# Patient Record
Sex: FEMALE | Race: BLACK OR AFRICAN AMERICAN | NOT HISPANIC OR LATINO | URBAN - METROPOLITAN AREA
[De-identification: names, ages, dates, MRNs, and addresses within clinical notes are randomized per-mention and may not be internally consistent; named-entity substitution may affect disease eponyms.]

---

## 2017-06-25 ENCOUNTER — EMERGENCY (EMERGENCY)
Facility: HOSPITAL | Age: 57
LOS: 1 days | Discharge: HOME | End: 2017-06-25
Attending: EMERGENCY MEDICINE | Admitting: EMERGENCY MEDICINE
Payer: COMMERCIAL

## 2017-06-25 VITALS
TEMPERATURE: 98 F | HEART RATE: 67 BPM | OXYGEN SATURATION: 100 % | RESPIRATION RATE: 16 BRPM | SYSTOLIC BLOOD PRESSURE: 132 MMHG | DIASTOLIC BLOOD PRESSURE: 91 MMHG

## 2017-06-25 DIAGNOSIS — Z98.51 TUBAL LIGATION STATUS: Chronic | ICD-10-CM

## 2017-06-25 LAB
ALBUMIN SERPL ELPH-MCNC: 4.2 G/DL — SIGNIFICANT CHANGE UP (ref 3.3–5)
ALP SERPL-CCNC: 91 U/L — SIGNIFICANT CHANGE UP (ref 40–120)
ALT FLD-CCNC: 12 U/L — SIGNIFICANT CHANGE UP (ref 4–33)
AST SERPL-CCNC: 16 U/L — SIGNIFICANT CHANGE UP (ref 4–32)
BASOPHILS # BLD AUTO: 0.02 K/UL — SIGNIFICANT CHANGE UP (ref 0–0.2)
BASOPHILS NFR BLD AUTO: 0.1 % — SIGNIFICANT CHANGE UP (ref 0–2)
BILIRUB SERPL-MCNC: 0.2 MG/DL — SIGNIFICANT CHANGE UP (ref 0.2–1.2)
BUN SERPL-MCNC: 15 MG/DL — SIGNIFICANT CHANGE UP (ref 7–23)
CALCIUM SERPL-MCNC: 9.4 MG/DL — SIGNIFICANT CHANGE UP (ref 8.4–10.5)
CHLORIDE SERPL-SCNC: 104 MMOL/L — SIGNIFICANT CHANGE UP (ref 98–107)
CK MB BLD-MCNC: 1.64 NG/ML — SIGNIFICANT CHANGE UP (ref 1–4.7)
CK MB BLD-MCNC: 1.65 NG/ML — SIGNIFICANT CHANGE UP (ref 1–4.7)
CK MB BLD-MCNC: SIGNIFICANT CHANGE UP (ref 0–2.5)
CK SERPL-CCNC: 71 U/L — SIGNIFICANT CHANGE UP (ref 25–170)
CK SERPL-CCNC: 72 U/L — SIGNIFICANT CHANGE UP (ref 25–170)
CO2 SERPL-SCNC: 23 MMOL/L — SIGNIFICANT CHANGE UP (ref 22–31)
CREAT SERPL-MCNC: 0.81 MG/DL — SIGNIFICANT CHANGE UP (ref 0.5–1.3)
EOSINOPHIL # BLD AUTO: 0.18 K/UL — SIGNIFICANT CHANGE UP (ref 0–0.5)
EOSINOPHIL NFR BLD AUTO: 1.3 % — SIGNIFICANT CHANGE UP (ref 0–6)
GLUCOSE SERPL-MCNC: 106 MG/DL — HIGH (ref 70–99)
HBA1C BLD-MCNC: 6.3 % — HIGH (ref 4–5.6)
HCT VFR BLD CALC: 39.8 % — SIGNIFICANT CHANGE UP (ref 34.5–45)
HGB BLD-MCNC: 13.1 G/DL — SIGNIFICANT CHANGE UP (ref 11.5–15.5)
IMM GRANULOCYTES NFR BLD AUTO: 0.4 % — SIGNIFICANT CHANGE UP (ref 0–1.5)
LYMPHOCYTES # BLD AUTO: 31.9 % — SIGNIFICANT CHANGE UP (ref 13–44)
LYMPHOCYTES # BLD AUTO: 4.43 K/UL — HIGH (ref 1–3.3)
MCHC RBC-ENTMCNC: 29.2 PG — SIGNIFICANT CHANGE UP (ref 27–34)
MCHC RBC-ENTMCNC: 32.9 % — SIGNIFICANT CHANGE UP (ref 32–36)
MCV RBC AUTO: 88.8 FL — SIGNIFICANT CHANGE UP (ref 80–100)
MONOCYTES # BLD AUTO: 0.83 K/UL — SIGNIFICANT CHANGE UP (ref 0–0.9)
MONOCYTES NFR BLD AUTO: 6 % — SIGNIFICANT CHANGE UP (ref 2–14)
NEUTROPHILS # BLD AUTO: 8.37 K/UL — HIGH (ref 1.8–7.4)
NEUTROPHILS NFR BLD AUTO: 60.3 % — SIGNIFICANT CHANGE UP (ref 43–77)
PLATELET # BLD AUTO: 347 K/UL — SIGNIFICANT CHANGE UP (ref 150–400)
PMV BLD: 9.2 FL — SIGNIFICANT CHANGE UP (ref 7–13)
POTASSIUM SERPL-MCNC: 3.6 MMOL/L — SIGNIFICANT CHANGE UP (ref 3.5–5.3)
POTASSIUM SERPL-SCNC: 3.6 MMOL/L — SIGNIFICANT CHANGE UP (ref 3.5–5.3)
PROT SERPL-MCNC: 7.4 G/DL — SIGNIFICANT CHANGE UP (ref 6–8.3)
RBC # BLD: 4.48 M/UL — SIGNIFICANT CHANGE UP (ref 3.8–5.2)
RBC # FLD: 14.4 % — SIGNIFICANT CHANGE UP (ref 10.3–14.5)
SODIUM SERPL-SCNC: 143 MMOL/L — SIGNIFICANT CHANGE UP (ref 135–145)
TROPONIN T SERPL-MCNC: < 0.06 NG/ML — SIGNIFICANT CHANGE UP (ref 0–0.06)
TROPONIN T SERPL-MCNC: < 0.06 NG/ML — SIGNIFICANT CHANGE UP (ref 0–0.06)
WBC # BLD: 13.88 K/UL — HIGH (ref 3.8–10.5)
WBC # FLD AUTO: 13.88 K/UL — HIGH (ref 3.8–10.5)

## 2017-06-25 PROCEDURE — 99220: CPT

## 2017-06-25 PROCEDURE — 71020: CPT | Mod: 26

## 2017-06-25 RX ORDER — AMLODIPINE BESYLATE 2.5 MG/1
5 TABLET ORAL DAILY
Qty: 0 | Refills: 0 | Status: DISCONTINUED | OUTPATIENT
Start: 2017-06-25 | End: 2017-06-29

## 2017-06-25 RX ORDER — VALSARTAN 80 MG/1
160 TABLET ORAL DAILY
Qty: 0 | Refills: 0 | Status: DISCONTINUED | OUTPATIENT
Start: 2017-06-25 | End: 2017-06-29

## 2017-06-25 RX ORDER — ASPIRIN/CALCIUM CARB/MAGNESIUM 324 MG
162 TABLET ORAL ONCE
Qty: 0 | Refills: 0 | Status: COMPLETED | OUTPATIENT
Start: 2017-06-25 | End: 2017-06-25

## 2017-06-25 RX ORDER — ALBUTEROL 90 UG/1
2 AEROSOL, METERED ORAL EVERY 4 HOURS
Qty: 0 | Refills: 0 | Status: DISCONTINUED | OUTPATIENT
Start: 2017-06-25 | End: 2017-06-29

## 2017-06-25 RX ADMIN — ALBUTEROL 2 PUFF(S): 90 AEROSOL, METERED ORAL at 19:20

## 2017-06-25 RX ADMIN — Medication 500 MILLIGRAM(S): at 13:59

## 2017-06-25 RX ADMIN — Medication 40 MILLIGRAM(S): at 15:46

## 2017-06-25 RX ADMIN — Medication 162 MILLIGRAM(S): at 15:46

## 2017-06-25 RX ADMIN — Medication 500 MILLIGRAM(S): at 13:29

## 2017-06-25 NOTE — ED PROVIDER NOTE - MEDICAL DECISION MAKING DETAILS
likely bronchitis, well appearing w/ clear lungs, will check cxr, given spacer and instructed on proper use w/ albuterol, will also d/c w/ naproxen

## 2017-06-25 NOTE — ED CDU PROVIDER NOTE - PLAN OF CARE
Take aspirin daily, follow up with cardiology (see attached list). Rest, drink plenty of fluids.  Advance activity as tolerated.  Continue all previously prescribed medications as directed. You can use motrin 600mg every 6-8 hours for pain or fever, and/or Tylenol 650 mg every 4 hours for pain/fever. Follow up with your primary care physician in 48-72 hours- bring copies of your results.  Return to the emergency department for chest pain, shortness of breath, dizziness, or worsening, concerning, or persistent symptoms.

## 2017-06-25 NOTE — ED PROVIDER NOTE - ATTENDING CONTRIBUTION TO CARE
Locurto pt c/o 1- increased nasal congestion and productive cough for 5 days with development of b/l reib pain with cough  2 2 weeks of HATHAWAY  needs to stop mid flight when climbing stairs  feels tight in chest (+feels like can't get air in)  Pt w/o h/o RAD thogh has had subjective wheezing   given albuterol pump by carla but use limited by side effects  exam pt in NAD no wheezes or rales on exam at present  card RRR no murmur or gallop no LE edema no calf tenderness Locurto pt c/o 1- increased nasal congestion and productive cough for 5 days with development of b/l reib pain with cough  2 2 weeks of HATHAWAY  needs to stop mid flight when climbing stairs  feels tight in chest (+feels like can't get air in)  Pt w/o h/o RAD thogh has had subjective wheezing   given albuterol pump by carla but use limited by side effects  exam pt in NAD no wheezes or rales on exam at present  card RRR no murmur or gallop no LE edema no calf tenderness  EKG no ischemic changes  CXR negative  most likely new RAD    however on exam here lungs clear    HATHAWAY/chest tiightness predates URI  will treat RAD hold for stress in am

## 2017-06-25 NOTE — ED CDU PROVIDER NOTE - PROGRESS NOTE DETAILS
Pt sleeping. CE'S neg x 2. Awaiting stress test in AM ROBERTO CARLOS Sotelo: stress test normal study, pt doing well. Mild cough but feels improve. Denies SOB or CP at this time. Will d/c home with outpatient follow up. Pt agrees with plan. Todd: Supervised ROBERTO CARLOS Sotelo 6/26/17 Todd (Physician) CDU ATTENDING D/C NOTE: Middle-age woman, smoker, URI Sx. Gets CP when coughs. Didn't respond to typical outpatient URI/bronchospasm Rx. Came to ED. Admitted for CDU for stress test. Trop and EKG w/o e/o acute ischemia. Stress test neg. Appears well. NAD. AFVSS. Strong pulse. Respirations unlabored. No LE edema. Discharge home.

## 2017-06-25 NOTE — ED CDU PROVIDER NOTE - OBJECTIVE STATEMENT
57 year old female +tobacco use 1ppd x ~40 years, HTN pw 1 week of productive cough with green sputum associated with bilateral posterior chest wall pain and HATHAWAY. Chest wall pain is sore in nature - pain only occurs when coughing - not actively having pain now. Pt has been taking Mucinex with minimal relief. Pt went to urgent care center >1 week ago for HATHAWAY given inhaler but has not been using it because it makes her feel "jittery". Admits to nasal congestion. Denies n/v/f/c, abdominal pain, lightheadedness, dizziness, LOC, syncope, hx of DVT/clot/PE, hx of travel, hemoptysis, hx of asthma/COPD. 57 year old female +tobacco use 1ppd x ~40 years, HTN pw 1 week of productive cough with green sputum associated with bilateral posterior chest wall pain and HATHAWAY. Chest wall pain is sore in nature - pain only occurs when coughing - not actively having pain now. Pt has been taking Mucinex with minimal relief. Pt went to urgent care center >1 week ago for HATHAWAY given inhaler but has not been using it because it makes her feel "jittery". Admits to nasal congestion. Denies n/v/f/c, abdominal pain, lightheadedness, dizziness, LOC, syncope, hx of DVT/clot/PE, hx of travel, hemoptysis, hx of asthma/COPD, urinary symptoms.

## 2017-06-25 NOTE — ED PROVIDER NOTE - OBJECTIVE STATEMENT
56yo F w/ HTN, smoked 1pk/day from teens until last wk (currently w/ nicotine patch), p/w 5d of cough w/ yellow sputum and b/l rib pain from frequent coughing.  Taking mucinex w/o relief, has used occasional albuterol w/ some relief but not frequently b/c of jitteriness.  Denies fever.

## 2017-06-25 NOTE — ED CDU PROVIDER NOTE - MEDICAL DECISION MAKING DETAILS
57 year old female +tobacco use 1ppd x ~40 years, HTN pw 1 week of productive cough with green sputum associated with bilateral posterior chest wall pain and HATHAWAY.  Plan: ce, EKG, stress, steroids, albuterol

## 2017-06-25 NOTE — ED CDU PROVIDER NOTE - ATTENDING CONTRIBUTION TO CARE
57F h/o HTN, TOB presents with progressively worsening HATHAWAY and chest tightness over the last several weeks.  Also with cough and nasal congestion over the last week.  No fever.  No prior cardiac or pulmonary history.  In ED cough somewhat improved with albuterol.  CXR clear.  EKG no acute ischemia.  Labs including trop negative.  On exam well appearing, nad, lungs clear, rrr, abd soft, no rash, no edema, 2+ pulses, speech clear, awake and alert. Plan for serial trop and stress in the morning. BRIANNA Cortes MD

## 2017-06-25 NOTE — ED ADULT TRIAGE NOTE - CHIEF COMPLAINT QUOTE
Pt with a productive cough, c/o bilateral pain to her ribs, worse when coughing and taking deep breaths, denies chest pain, c/o SOB, denies fevers/chills.

## 2017-06-26 VITALS
RESPIRATION RATE: 18 BRPM | SYSTOLIC BLOOD PRESSURE: 128 MMHG | HEART RATE: 79 BPM | OXYGEN SATURATION: 100 % | DIASTOLIC BLOOD PRESSURE: 84 MMHG | TEMPERATURE: 98 F

## 2017-06-26 PROCEDURE — 78452 HT MUSCLE IMAGE SPECT MULT: CPT | Mod: 26

## 2017-06-26 PROCEDURE — 93018 CV STRESS TEST I&R ONLY: CPT | Mod: GC

## 2017-06-26 PROCEDURE — 93016 CV STRESS TEST SUPVJ ONLY: CPT | Mod: GC

## 2017-06-26 PROCEDURE — 99217: CPT

## 2017-06-26 RX ADMIN — AMLODIPINE BESYLATE 5 MILLIGRAM(S): 2.5 TABLET ORAL at 06:04

## 2017-06-26 RX ADMIN — ALBUTEROL 2 PUFF(S): 90 AEROSOL, METERED ORAL at 10:00

## 2017-06-26 RX ADMIN — VALSARTAN 160 MILLIGRAM(S): 80 TABLET ORAL at 06:04

## 2017-06-26 RX ADMIN — Medication 40 MILLIGRAM(S): at 09:56

## 2018-03-26 ENCOUNTER — EMERGENCY (EMERGENCY)
Facility: HOSPITAL | Age: 58
LOS: 1 days | Discharge: HOME | End: 2018-03-26
Attending: EMERGENCY MEDICINE | Admitting: EMERGENCY MEDICINE
Payer: COMMERCIAL

## 2018-03-26 VITALS
RESPIRATION RATE: 18 BRPM | OXYGEN SATURATION: 98 % | HEART RATE: 85 BPM | TEMPERATURE: 98 F | SYSTOLIC BLOOD PRESSURE: 137 MMHG | DIASTOLIC BLOOD PRESSURE: 57 MMHG

## 2018-03-26 DIAGNOSIS — Z98.51 TUBAL LIGATION STATUS: Chronic | ICD-10-CM

## 2018-03-26 LAB
ALBUMIN SERPL ELPH-MCNC: 4.2 G/DL — SIGNIFICANT CHANGE UP (ref 3.3–5)
ALP SERPL-CCNC: 87 U/L — SIGNIFICANT CHANGE UP (ref 40–120)
ALT FLD-CCNC: 20 U/L — SIGNIFICANT CHANGE UP (ref 4–33)
AST SERPL-CCNC: 22 U/L — SIGNIFICANT CHANGE UP (ref 4–32)
BASOPHILS # BLD AUTO: 0.04 K/UL — SIGNIFICANT CHANGE UP (ref 0–0.2)
BASOPHILS NFR BLD AUTO: 0.3 % — SIGNIFICANT CHANGE UP (ref 0–2)
BILIRUB SERPL-MCNC: 0.3 MG/DL — SIGNIFICANT CHANGE UP (ref 0.2–1.2)
BUN SERPL-MCNC: 13 MG/DL — SIGNIFICANT CHANGE UP (ref 7–23)
CALCIUM SERPL-MCNC: 9.2 MG/DL — SIGNIFICANT CHANGE UP (ref 8.4–10.5)
CHLORIDE SERPL-SCNC: 101 MMOL/L — SIGNIFICANT CHANGE UP (ref 98–107)
CO2 SERPL-SCNC: 25 MMOL/L — SIGNIFICANT CHANGE UP (ref 22–31)
CREAT SERPL-MCNC: 0.73 MG/DL — SIGNIFICANT CHANGE UP (ref 0.5–1.3)
EOSINOPHIL # BLD AUTO: 0.13 K/UL — SIGNIFICANT CHANGE UP (ref 0–0.5)
EOSINOPHIL NFR BLD AUTO: 1 % — SIGNIFICANT CHANGE UP (ref 0–6)
GLUCOSE SERPL-MCNC: 101 MG/DL — HIGH (ref 70–99)
HCT VFR BLD CALC: 38.8 % — SIGNIFICANT CHANGE UP (ref 34.5–45)
HGB BLD-MCNC: 13.1 G/DL — SIGNIFICANT CHANGE UP (ref 11.5–15.5)
IMM GRANULOCYTES # BLD AUTO: 0.03 # — SIGNIFICANT CHANGE UP
IMM GRANULOCYTES NFR BLD AUTO: 0.2 % — SIGNIFICANT CHANGE UP (ref 0–1.5)
LYMPHOCYTES # BLD AUTO: 29 % — SIGNIFICANT CHANGE UP (ref 13–44)
LYMPHOCYTES # BLD AUTO: 3.74 K/UL — HIGH (ref 1–3.3)
MCHC RBC-ENTMCNC: 29.6 PG — SIGNIFICANT CHANGE UP (ref 27–34)
MCHC RBC-ENTMCNC: 33.8 % — SIGNIFICANT CHANGE UP (ref 32–36)
MCV RBC AUTO: 87.8 FL — SIGNIFICANT CHANGE UP (ref 80–100)
MONOCYTES # BLD AUTO: 0.87 K/UL — SIGNIFICANT CHANGE UP (ref 0–0.9)
MONOCYTES NFR BLD AUTO: 6.7 % — SIGNIFICANT CHANGE UP (ref 2–14)
NEUTROPHILS # BLD AUTO: 8.1 K/UL — HIGH (ref 1.8–7.4)
NEUTROPHILS NFR BLD AUTO: 62.8 % — SIGNIFICANT CHANGE UP (ref 43–77)
NRBC # FLD: 0 — SIGNIFICANT CHANGE UP
PLATELET # BLD AUTO: 320 K/UL — SIGNIFICANT CHANGE UP (ref 150–400)
PMV BLD: 9.7 FL — SIGNIFICANT CHANGE UP (ref 7–13)
POTASSIUM SERPL-MCNC: 3.8 MMOL/L — SIGNIFICANT CHANGE UP (ref 3.5–5.3)
POTASSIUM SERPL-SCNC: 3.8 MMOL/L — SIGNIFICANT CHANGE UP (ref 3.5–5.3)
PROT SERPL-MCNC: 7.6 G/DL — SIGNIFICANT CHANGE UP (ref 6–8.3)
RBC # BLD: 4.42 M/UL — SIGNIFICANT CHANGE UP (ref 3.8–5.2)
RBC # FLD: 14.5 % — SIGNIFICANT CHANGE UP (ref 10.3–14.5)
SODIUM SERPL-SCNC: 138 MMOL/L — SIGNIFICANT CHANGE UP (ref 135–145)
WBC # BLD: 12.91 K/UL — HIGH (ref 3.8–10.5)
WBC # FLD AUTO: 12.91 K/UL — HIGH (ref 3.8–10.5)

## 2018-03-26 PROCEDURE — 70450 CT HEAD/BRAIN W/O DYE: CPT | Mod: 26

## 2018-03-26 PROCEDURE — 99284 EMERGENCY DEPT VISIT MOD MDM: CPT

## 2018-03-26 RX ORDER — MECLIZINE HCL 12.5 MG
1 TABLET ORAL
Qty: 9 | Refills: 0
Start: 2018-03-26 | End: 2018-03-28

## 2018-03-26 RX ORDER — MECLIZINE HCL 12.5 MG
50 TABLET ORAL ONCE
Qty: 0 | Refills: 0 | Status: COMPLETED | OUTPATIENT
Start: 2018-03-26 | End: 2018-03-26

## 2018-03-26 RX ADMIN — Medication 50 MILLIGRAM(S): at 13:35

## 2018-03-26 NOTE — ED PROVIDER NOTE - MEDICAL DECISION MAKING DETAILS
57F h/o HTN p/w intermittent dizziness, lasting few minutes, worse after standing, a/w tinnitus, normal exam, concerning for bppv  -supportive tx

## 2018-03-26 NOTE — ED PROVIDER NOTE - PROGRESS NOTE DETAILS
feeling much better after meclizine. labs and imaging unremarkable, discussed incidental finding of osteoma. nml tandem gait on repeat exam. will d/c and advise neuro f/u

## 2018-03-26 NOTE — ED PROVIDER NOTE - OBJECTIVE STATEMENT
57F h/o HTN p/w dizziness. Notes dizziness starting 3 days ago, lasting few minutes, occurred after standing, felt dizzy, unsteady, had to sit down. Dizziness recurred again today after standing, dizziness resolved though pt still feels "uncentered," a/w bilateral tinnitus. Denies F, CP/SOB, abd pain, weakness, numbness.

## 2018-03-26 NOTE — ED ADULT TRIAGE NOTE - CHIEF COMPLAINT QUOTE
p/t c/o of dizziness on and off for few days, ringing sensation to bilateral ears as well, denies any headaches, no chest pain , no neuro deficits noted

## 2018-03-26 NOTE — ED PROVIDER NOTE - ATTENDING CONTRIBUTION TO CARE
Locurto  pt c/o 2 episodes of dizziness with position change  h/o prior episode 6  mos ago  This time assoc with tinnitus    exam pt in NAD clear lungs  no murmurs  benign abd  neuro Nl strength sensation and cerebellar  Hallpike produced similar sx (though milder)  Rhomberg negative  Pt able to tandem walk with mild difficulty  Imp vertigo with tinnitus  no other hard CN finding  suggestive of inner ear  CT performed  treated with meclizine  should have outpt neuro follow up

## 2018-09-19 ENCOUNTER — RESULT REVIEW (OUTPATIENT)
Age: 58
End: 2018-09-19

## 2018-10-17 ENCOUNTER — RESULT REVIEW (OUTPATIENT)
Age: 58
End: 2018-10-17

## 2019-08-26 ENCOUNTER — EMERGENCY (EMERGENCY)
Facility: HOSPITAL | Age: 59
LOS: 1 days | Discharge: HOME | End: 2019-08-26
Attending: EMERGENCY MEDICINE | Admitting: EMERGENCY MEDICINE
Payer: COMMERCIAL

## 2019-08-26 VITALS
OXYGEN SATURATION: 97 % | RESPIRATION RATE: 15 BRPM | SYSTOLIC BLOOD PRESSURE: 135 MMHG | DIASTOLIC BLOOD PRESSURE: 74 MMHG | HEART RATE: 105 BPM | TEMPERATURE: 102 F

## 2019-08-26 DIAGNOSIS — Z98.51 TUBAL LIGATION STATUS: Chronic | ICD-10-CM

## 2019-08-26 PROCEDURE — 99284 EMERGENCY DEPT VISIT MOD MDM: CPT

## 2019-08-26 RX ORDER — ACETAMINOPHEN 500 MG
650 TABLET ORAL ONCE
Refills: 0 | Status: COMPLETED | OUTPATIENT
Start: 2019-08-26 | End: 2019-08-26

## 2019-08-26 RX ADMIN — Medication 650 MILLIGRAM(S): at 23:06

## 2019-08-26 NOTE — ED ADULT TRIAGE NOTE - CHIEF COMPLAINT QUOTE
Pt c/o nausea/vomiting, chills, and generalized body aches for the past 3 days.  Pt endorses dizziness.  Pt unable to tolerate PO.  Pt states was visiting granddaughter and she  had a fever while she was there.  PMHx: HTN

## 2019-08-27 VITALS
RESPIRATION RATE: 18 BRPM | TEMPERATURE: 99 F | SYSTOLIC BLOOD PRESSURE: 101 MMHG | DIASTOLIC BLOOD PRESSURE: 65 MMHG | OXYGEN SATURATION: 98 % | HEART RATE: 72 BPM

## 2019-08-27 LAB
ALBUMIN SERPL ELPH-MCNC: 3.8 G/DL — SIGNIFICANT CHANGE UP (ref 3.3–5)
ALP SERPL-CCNC: 69 U/L — SIGNIFICANT CHANGE UP (ref 40–120)
ALT FLD-CCNC: 15 U/L — SIGNIFICANT CHANGE UP (ref 4–33)
ANION GAP SERPL CALC-SCNC: 20 MMO/L — HIGH (ref 7–14)
APPEARANCE UR: SIGNIFICANT CHANGE UP
AST SERPL-CCNC: 34 U/L — HIGH (ref 4–32)
BACTERIA # UR AUTO: NEGATIVE — SIGNIFICANT CHANGE UP
BASE EXCESS BLDV CALC-SCNC: -4.7 MMOL/L — SIGNIFICANT CHANGE UP
BASOPHILS # BLD AUTO: 0.04 K/UL — SIGNIFICANT CHANGE UP (ref 0–0.2)
BASOPHILS NFR BLD AUTO: 0.3 % — SIGNIFICANT CHANGE UP (ref 0–2)
BILIRUB SERPL-MCNC: 0.4 MG/DL — SIGNIFICANT CHANGE UP (ref 0.2–1.2)
BILIRUB UR-MCNC: NEGATIVE — SIGNIFICANT CHANGE UP
BLOOD GAS VENOUS - CREATININE: 1.16 MG/DL — SIGNIFICANT CHANGE UP (ref 0.5–1.3)
BLOOD UR QL VISUAL: HIGH
BUN SERPL-MCNC: 13 MG/DL — SIGNIFICANT CHANGE UP (ref 7–23)
CALCIUM SERPL-MCNC: 8.9 MG/DL — SIGNIFICANT CHANGE UP (ref 8.4–10.5)
CHLORIDE BLDV-SCNC: 101 MMOL/L — SIGNIFICANT CHANGE UP (ref 96–108)
CHLORIDE SERPL-SCNC: 94 MMOL/L — LOW (ref 98–107)
CO2 SERPL-SCNC: 16 MMOL/L — LOW (ref 22–31)
COLOR SPEC: YELLOW — SIGNIFICANT CHANGE UP
CREAT SERPL-MCNC: 1.08 MG/DL — SIGNIFICANT CHANGE UP (ref 0.5–1.3)
EOSINOPHIL # BLD AUTO: 0.01 K/UL — SIGNIFICANT CHANGE UP (ref 0–0.5)
EOSINOPHIL NFR BLD AUTO: 0.1 % — SIGNIFICANT CHANGE UP (ref 0–6)
GAS PNL BLDV: 128 MMOL/L — LOW (ref 136–146)
GLUCOSE BLDV-MCNC: 101 MG/DL — HIGH (ref 70–99)
GLUCOSE SERPL-MCNC: 91 MG/DL — SIGNIFICANT CHANGE UP (ref 70–99)
GLUCOSE UR-MCNC: NEGATIVE — SIGNIFICANT CHANGE UP
HCO3 BLDV-SCNC: 20 MMOL/L — SIGNIFICANT CHANGE UP (ref 20–27)
HCT VFR BLD CALC: 32.5 % — LOW (ref 34.5–45)
HCT VFR BLDV CALC: 36.8 % — SIGNIFICANT CHANGE UP (ref 34.5–45)
HGB BLD-MCNC: 11.1 G/DL — LOW (ref 11.5–15.5)
HGB BLDV-MCNC: 12 G/DL — SIGNIFICANT CHANGE UP (ref 11.5–15.5)
IMM GRANULOCYTES NFR BLD AUTO: 0.5 % — SIGNIFICANT CHANGE UP (ref 0–1.5)
KETONES UR-MCNC: HIGH
LACTATE BLDV-MCNC: 1.1 MMOL/L — SIGNIFICANT CHANGE UP (ref 0.5–2)
LEUKOCYTE ESTERASE UR-ACNC: NEGATIVE — SIGNIFICANT CHANGE UP
LYMPHOCYTES # BLD AUTO: 15.6 % — SIGNIFICANT CHANGE UP (ref 13–44)
LYMPHOCYTES # BLD AUTO: 2.21 K/UL — SIGNIFICANT CHANGE UP (ref 1–3.3)
MCHC RBC-ENTMCNC: 28.7 PG — SIGNIFICANT CHANGE UP (ref 27–34)
MCHC RBC-ENTMCNC: 34.2 % — SIGNIFICANT CHANGE UP (ref 32–36)
MCV RBC AUTO: 84 FL — SIGNIFICANT CHANGE UP (ref 80–100)
MONOCYTES # BLD AUTO: 1.08 K/UL — HIGH (ref 0–0.9)
MONOCYTES NFR BLD AUTO: 7.6 % — SIGNIFICANT CHANGE UP (ref 2–14)
NEUTROPHILS # BLD AUTO: 10.75 K/UL — HIGH (ref 1.8–7.4)
NEUTROPHILS NFR BLD AUTO: 75.9 % — SIGNIFICANT CHANGE UP (ref 43–77)
NITRITE UR-MCNC: NEGATIVE — SIGNIFICANT CHANGE UP
NRBC # FLD: 0 K/UL — SIGNIFICANT CHANGE UP (ref 0–0)
PCO2 BLDV: 30 MMHG — LOW (ref 41–51)
PH BLDV: 7.42 PH — SIGNIFICANT CHANGE UP (ref 7.32–7.43)
PH UR: 6 — SIGNIFICANT CHANGE UP (ref 5–8)
PLATELET # BLD AUTO: 190 K/UL — SIGNIFICANT CHANGE UP (ref 150–400)
PMV BLD: 10 FL — SIGNIFICANT CHANGE UP (ref 7–13)
PO2 BLDV: 30 MMHG — LOW (ref 35–40)
POTASSIUM BLDV-SCNC: 3.2 MMOL/L — LOW (ref 3.4–4.5)
POTASSIUM SERPL-MCNC: 3.5 MMOL/L — SIGNIFICANT CHANGE UP (ref 3.5–5.3)
POTASSIUM SERPL-SCNC: 3.5 MMOL/L — SIGNIFICANT CHANGE UP (ref 3.5–5.3)
PROT SERPL-MCNC: 7.7 G/DL — SIGNIFICANT CHANGE UP (ref 6–8.3)
PROT UR-MCNC: 300 — HIGH
RBC # BLD: 3.87 M/UL — SIGNIFICANT CHANGE UP (ref 3.8–5.2)
RBC # FLD: 15.3 % — HIGH (ref 10.3–14.5)
RBC CASTS # UR COMP ASSIST: SIGNIFICANT CHANGE UP (ref 0–?)
SAO2 % BLDV: 54 % — LOW (ref 60–85)
SODIUM SERPL-SCNC: 130 MMOL/L — LOW (ref 135–145)
SP GR SPEC: 1.02 — SIGNIFICANT CHANGE UP (ref 1–1.04)
SQUAMOUS # UR AUTO: SIGNIFICANT CHANGE UP
UROBILINOGEN FLD QL: SIGNIFICANT CHANGE UP
WBC # BLD: 14.16 K/UL — HIGH (ref 3.8–10.5)
WBC # FLD AUTO: 14.16 K/UL — HIGH (ref 3.8–10.5)
WBC UR QL: HIGH (ref 0–?)

## 2019-08-27 RX ORDER — SODIUM CHLORIDE 9 MG/ML
1000 INJECTION, SOLUTION INTRAVENOUS
Refills: 0 | Status: DISCONTINUED | OUTPATIENT
Start: 2019-08-27 | End: 2019-08-30

## 2019-08-27 RX ORDER — CEPHALEXIN 500 MG
1 CAPSULE ORAL
Qty: 14 | Refills: 0
Start: 2019-08-27 | End: 2019-09-02

## 2019-08-27 RX ORDER — SODIUM CHLORIDE 9 MG/ML
1000 INJECTION INTRAMUSCULAR; INTRAVENOUS; SUBCUTANEOUS ONCE
Refills: 0 | Status: COMPLETED | OUTPATIENT
Start: 2019-08-27 | End: 2019-08-27

## 2019-08-27 RX ORDER — CEFTRIAXONE 500 MG/1
1000 INJECTION, POWDER, FOR SOLUTION INTRAMUSCULAR; INTRAVENOUS ONCE
Refills: 0 | Status: COMPLETED | OUTPATIENT
Start: 2019-08-27 | End: 2019-08-27

## 2019-08-27 RX ADMIN — SODIUM CHLORIDE 1000 MILLILITER(S): 9 INJECTION INTRAMUSCULAR; INTRAVENOUS; SUBCUTANEOUS at 04:00

## 2019-08-27 RX ADMIN — Medication 650 MILLIGRAM(S): at 01:43

## 2019-08-27 RX ADMIN — SODIUM CHLORIDE 1000 MILLILITER(S): 9 INJECTION INTRAMUSCULAR; INTRAVENOUS; SUBCUTANEOUS at 03:06

## 2019-08-27 RX ADMIN — SODIUM CHLORIDE 1000 MILLILITER(S): 9 INJECTION INTRAMUSCULAR; INTRAVENOUS; SUBCUTANEOUS at 01:43

## 2019-08-27 RX ADMIN — SODIUM CHLORIDE 500 MILLILITER(S): 9 INJECTION, SOLUTION INTRAVENOUS at 04:27

## 2019-08-27 RX ADMIN — SODIUM CHLORIDE 1000 MILLILITER(S): 9 INJECTION INTRAMUSCULAR; INTRAVENOUS; SUBCUTANEOUS at 01:33

## 2019-08-27 RX ADMIN — CEFTRIAXONE 100 MILLIGRAM(S): 500 INJECTION, POWDER, FOR SOLUTION INTRAMUSCULAR; INTRAVENOUS at 04:26

## 2019-08-27 NOTE — ED ADULT NURSE NOTE - NSIMPLEMENTINTERV_GEN_ALL_ED
Implemented All Universal Safety Interventions:  [place] to call system. Call bell, personal items and telephone within reach. Instruct patient to call for assistance. Room bathroom lighting operational. Non-slip footwear when patient is off stretcher. Physically safe environment: no spills, clutter or unnecessary equipment. Stretcher in lowest position, wheels locked, appropriate side rails in place.

## 2019-08-27 NOTE — ED PROVIDER NOTE - CARE PLAN
Principal Discharge DX:	Weakness  Secondary Diagnosis:	Dizziness Principal Discharge DX:	Weakness  Secondary Diagnosis:	Dizziness  Secondary Diagnosis:	Urinary tract infection without hematuria, site unspecified

## 2019-08-27 NOTE — ED ADULT NURSE NOTE - OBJECTIVE STATEMENT
Pt arrived to room 28, a&ox4, pleasant, calm and cooperative. Pt c/o of generalized body ache for about 3 days. Pt stated to visited family and child was sick. Pt became febrile, decrease PO intake. Pt denies chest pain, sob, n/v/d. Respirations even and unlabored, NAD noted. 20 G IV to right ac, labs drawn and sent. provider at bedside to [facility]. will continue to monitor

## 2019-08-27 NOTE — ED PROVIDER NOTE - OBJECTIVE STATEMENT
60 yo F with HTN that presents with generalized weakness, bodyaches, dizziness, and not tolerating PO for 3 days. Was in [place] visiting granddaughter who was sick. Pt and granddaughter went to  and pt started "feeling funny" 3 days ago but denies any runny nose, cough, headache, vision/hearing changes, paresthesias, N/V/D, chest pain, SOB, abd pain. Today she wanted to come back to [facility] so took [employer] and [employer] and came to ED for evaluation. Hasn't had much to eat or drink in last 3 days but today drank some fluids and had vomiting x 1 without blood. Has not taken any meds otherwise. Also + dysuria and diff urinating, thought it was because she hasn't had much to drink. s/p tubal ligations. No vaginal bleeding or discharge. Non smoker, no drinking, no drugs.

## 2019-08-27 NOTE — ED ADULT NURSE REASSESSMENT NOTE - NS ED NURSE REASSESS COMMENT FT1
Pt resting on stretcher, a&ox4, calm and cooperative. pt denies chest pain, sob, n/v/d. Respirations even and unlabored, NAD noted. Medication to be administered, provider aware. will continue to monitor

## 2019-08-27 NOTE — ED PROVIDER NOTE - PROGRESS NOTE DETAILS
Pt walked to bathroom by herself, not dizzy, no weakness. S/p Ceftriaxone for UTI and D5 1/2 NS for ketonuria. Offered pt admission for symptomatic UTI but refused. Wants to go home to take PO meds and f/u with her PMD. labs given, return precautions explained and understood.

## 2019-08-27 NOTE — ED PROVIDER NOTE - CLINICAL SUMMARY MEDICAL DECISION MAKING FREE TEXT BOX
58 yo F with HTN that presents with generalized weakness with sick contacts 3 days ago, recently returned from [place]. Appears well otherwise with benign exam. Most likely UTI given report and symptoms. Will hydrate with IVF and obtain labs. Pt febrile here, will provide tylenol and monitor. Does meet SIRS but does not appear septic. No URI symptoms reported. Hold RVP for now.

## 2019-08-27 NOTE — ED PROVIDER NOTE - NSFOLLOWUPINSTRUCTIONS_ED_ALL_ED_FT
Please follow up with your primary care doctor after you leave the emergency department so that they can follow up and conduct more testing and treatment as they deem necessary. If you have worsening signs or symptoms of what you came in to the Emergency Department today and are not able to see your doctor, go to your nearest emergency department or return to the [facility] emergency department for further care and management.

## 2019-08-28 ENCOUNTER — INPATIENT (INPATIENT)
Facility: HOSPITAL | Age: 59
LOS: 5 days | Discharge: HOME | DRG: 871 | End: 2019-09-03
Attending: INTERNAL MEDICINE | Admitting: INTERNAL MEDICINE
Payer: COMMERCIAL

## 2019-08-28 VITALS
OXYGEN SATURATION: 96 % | DIASTOLIC BLOOD PRESSURE: 67 MMHG | HEIGHT: 64 IN | RESPIRATION RATE: 22 BRPM | TEMPERATURE: 100 F | WEIGHT: 171.96 LBS | SYSTOLIC BLOOD PRESSURE: 122 MMHG | HEART RATE: 111 BPM

## 2019-08-28 DIAGNOSIS — E87.8 OTHER DISORDERS OF ELECTROLYTE AND FLUID BALANCE, NOT ELSEWHERE CLASSIFIED: ICD-10-CM

## 2019-08-28 DIAGNOSIS — Z98.51 TUBAL LIGATION STATUS: Chronic | ICD-10-CM

## 2019-08-28 DIAGNOSIS — I10 ESSENTIAL (PRIMARY) HYPERTENSION: ICD-10-CM

## 2019-08-28 DIAGNOSIS — A41.9 SEPSIS, UNSPECIFIED ORGANISM: ICD-10-CM

## 2019-08-28 DIAGNOSIS — F10.10 ALCOHOL ABUSE, UNCOMPLICATED: ICD-10-CM

## 2019-08-28 DIAGNOSIS — Z29.9 ENCOUNTER FOR PROPHYLACTIC MEASURES, UNSPECIFIED: ICD-10-CM

## 2019-08-28 DIAGNOSIS — R94.5 ABNORMAL RESULTS OF LIVER FUNCTION STUDIES: ICD-10-CM

## 2019-08-28 DIAGNOSIS — R50.9 FEVER, UNSPECIFIED: ICD-10-CM

## 2019-08-28 DIAGNOSIS — D64.9 ANEMIA, UNSPECIFIED: ICD-10-CM

## 2019-08-28 LAB
ALBUMIN SERPL ELPH-MCNC: 3.9 G/DL — SIGNIFICANT CHANGE UP (ref 3.3–5)
ALP SERPL-CCNC: 90 U/L — SIGNIFICANT CHANGE UP (ref 40–120)
ALT FLD-CCNC: 70 U/L — HIGH (ref 10–45)
ANION GAP SERPL CALC-SCNC: 14 MMOL/L — SIGNIFICANT CHANGE UP (ref 5–17)
AST SERPL-CCNC: 131 U/L — HIGH (ref 10–40)
BACTERIA UR CULT: SIGNIFICANT CHANGE UP
BASOPHILS # BLD AUTO: 0 K/UL — SIGNIFICANT CHANGE UP (ref 0–0.2)
BASOPHILS NFR BLD AUTO: 0.2 % — SIGNIFICANT CHANGE UP (ref 0–2)
BILIRUB SERPL-MCNC: 0.3 MG/DL — SIGNIFICANT CHANGE UP (ref 0.2–1.2)
BUN SERPL-MCNC: 12 MG/DL — SIGNIFICANT CHANGE UP (ref 7–23)
CALCIUM SERPL-MCNC: 9.8 MG/DL — SIGNIFICANT CHANGE UP (ref 8.4–10.5)
CHLORIDE SERPL-SCNC: 97 MMOL/L — SIGNIFICANT CHANGE UP (ref 96–108)
CO2 SERPL-SCNC: 20 MMOL/L — LOW (ref 22–31)
CREAT SERPL-MCNC: 0.86 MG/DL — SIGNIFICANT CHANGE UP (ref 0.5–1.3)
EOSINOPHIL # BLD AUTO: 0 K/UL — SIGNIFICANT CHANGE UP (ref 0–0.5)
EOSINOPHIL NFR BLD AUTO: 0.4 % — SIGNIFICANT CHANGE UP (ref 0–6)
ETHANOL SERPL-MCNC: SIGNIFICANT CHANGE UP MG/DL (ref 0–10)
GAS PNL BLDV: SIGNIFICANT CHANGE UP
GLUCOSE SERPL-MCNC: 110 MG/DL — HIGH (ref 70–99)
HCT VFR BLD CALC: 34.8 % — SIGNIFICANT CHANGE UP (ref 34.5–45)
HGB BLD-MCNC: 11.1 G/DL — LOW (ref 11.5–15.5)
LYMPHOCYTES # BLD AUTO: 1.9 K/UL — SIGNIFICANT CHANGE UP (ref 1–3.3)
LYMPHOCYTES # BLD AUTO: 13.5 % — SIGNIFICANT CHANGE UP (ref 13–44)
MAGNESIUM SERPL-MCNC: 1.8 MG/DL — SIGNIFICANT CHANGE UP (ref 1.6–2.6)
MCHC RBC-ENTMCNC: 28 PG — SIGNIFICANT CHANGE UP (ref 27–34)
MCHC RBC-ENTMCNC: 32 GM/DL — SIGNIFICANT CHANGE UP (ref 32–36)
MCV RBC AUTO: 87.5 FL — SIGNIFICANT CHANGE UP (ref 80–100)
MONOCYTES # BLD AUTO: 0.9 K/UL — SIGNIFICANT CHANGE UP (ref 0–0.9)
MONOCYTES NFR BLD AUTO: 6.3 % — SIGNIFICANT CHANGE UP (ref 2–14)
NEUTROPHILS # BLD AUTO: 11.1 K/UL — HIGH (ref 1.8–7.4)
NEUTROPHILS NFR BLD AUTO: 79.6 % — HIGH (ref 43–77)
PLATELET # BLD AUTO: 229 K/UL — SIGNIFICANT CHANGE UP (ref 150–400)
POTASSIUM SERPL-MCNC: 3.2 MMOL/L — LOW (ref 3.5–5.3)
POTASSIUM SERPL-SCNC: 3.2 MMOL/L — LOW (ref 3.5–5.3)
PROT SERPL-MCNC: 8.1 G/DL — SIGNIFICANT CHANGE UP (ref 6–8.3)
RBC # BLD: 3.98 M/UL — SIGNIFICANT CHANGE UP (ref 3.8–5.2)
RBC # FLD: 15.2 % — HIGH (ref 10.3–14.5)
SODIUM SERPL-SCNC: 131 MMOL/L — LOW (ref 135–145)
SPECIMEN SOURCE: SIGNIFICANT CHANGE UP
TROPONIN T, HIGH SENSITIVITY RESULT: 10 NG/L — SIGNIFICANT CHANGE UP (ref 0–51)
WBC # BLD: 14 K/UL — HIGH (ref 3.8–10.5)
WBC # FLD AUTO: 14 K/UL — HIGH (ref 3.8–10.5)

## 2019-08-28 PROCEDURE — 99285 EMERGENCY DEPT VISIT HI MDM: CPT

## 2019-08-28 PROCEDURE — 71046 X-RAY EXAM CHEST 2 VIEWS: CPT | Mod: 26

## 2019-08-28 PROCEDURE — 93010 ELECTROCARDIOGRAM REPORT: CPT

## 2019-08-28 PROCEDURE — 70450 CT HEAD/BRAIN W/O DYE: CPT | Mod: 26

## 2019-08-28 RX ORDER — SODIUM CHLORIDE 9 MG/ML
1000 INJECTION INTRAMUSCULAR; INTRAVENOUS; SUBCUTANEOUS ONCE
Refills: 0 | Status: COMPLETED | OUTPATIENT
Start: 2019-08-28 | End: 2019-08-28

## 2019-08-28 RX ORDER — AMLODIPINE BESYLATE 2.5 MG/1
5 TABLET ORAL DAILY
Refills: 0 | Status: DISCONTINUED | OUTPATIENT
Start: 2019-08-28 | End: 2019-08-29

## 2019-08-28 RX ORDER — IBUPROFEN 200 MG
400 TABLET ORAL ONCE
Refills: 0 | Status: COMPLETED | OUTPATIENT
Start: 2019-08-28 | End: 2019-08-28

## 2019-08-28 RX ORDER — THIAMINE MONONITRATE (VIT B1) 100 MG
100 TABLET ORAL DAILY
Refills: 0 | Status: DISCONTINUED | OUTPATIENT
Start: 2019-08-28 | End: 2019-09-03

## 2019-08-28 RX ORDER — PANTOPRAZOLE SODIUM 20 MG/1
40 TABLET, DELAYED RELEASE ORAL
Refills: 0 | Status: DISCONTINUED | OUTPATIENT
Start: 2019-08-28 | End: 2019-09-03

## 2019-08-28 RX ORDER — SODIUM CHLORIDE 9 MG/ML
2000 INJECTION INTRAMUSCULAR; INTRAVENOUS; SUBCUTANEOUS ONCE
Refills: 0 | Status: DISCONTINUED | OUTPATIENT
Start: 2019-08-28 | End: 2019-08-28

## 2019-08-28 RX ORDER — SODIUM CHLORIDE 9 MG/ML
1000 INJECTION INTRAMUSCULAR; INTRAVENOUS; SUBCUTANEOUS
Refills: 0 | Status: DISCONTINUED | OUTPATIENT
Start: 2019-08-28 | End: 2019-08-31

## 2019-08-28 RX ORDER — VANCOMYCIN HCL 1 G
1000 VIAL (EA) INTRAVENOUS ONCE
Refills: 0 | Status: COMPLETED | OUTPATIENT
Start: 2019-08-28 | End: 2019-08-28

## 2019-08-28 RX ORDER — FOLIC ACID 0.8 MG
1 TABLET ORAL DAILY
Refills: 0 | Status: DISCONTINUED | OUTPATIENT
Start: 2019-08-28 | End: 2019-09-03

## 2019-08-28 RX ORDER — HEPARIN SODIUM 5000 [USP'U]/ML
5000 INJECTION INTRAVENOUS; SUBCUTANEOUS EVERY 8 HOURS
Refills: 0 | Status: DISCONTINUED | OUTPATIENT
Start: 2019-08-28 | End: 2019-09-03

## 2019-08-28 RX ORDER — POTASSIUM CHLORIDE 20 MEQ
40 PACKET (EA) ORAL ONCE
Refills: 0 | Status: COMPLETED | OUTPATIENT
Start: 2019-08-28 | End: 2019-08-28

## 2019-08-28 RX ORDER — LOSARTAN POTASSIUM 100 MG/1
100 TABLET, FILM COATED ORAL DAILY
Refills: 0 | Status: DISCONTINUED | OUTPATIENT
Start: 2019-08-28 | End: 2019-08-29

## 2019-08-28 RX ORDER — ACETAMINOPHEN 500 MG
650 TABLET ORAL ONCE
Refills: 0 | Status: COMPLETED | OUTPATIENT
Start: 2019-08-28 | End: 2019-08-28

## 2019-08-28 RX ORDER — PIPERACILLIN AND TAZOBACTAM 4; .5 G/20ML; G/20ML
3.38 INJECTION, POWDER, LYOPHILIZED, FOR SOLUTION INTRAVENOUS ONCE
Refills: 0 | Status: COMPLETED | OUTPATIENT
Start: 2019-08-28 | End: 2019-08-28

## 2019-08-28 RX ADMIN — Medication 1 MILLIGRAM(S): at 20:51

## 2019-08-28 RX ADMIN — SODIUM CHLORIDE 1000 MILLILITER(S): 9 INJECTION INTRAMUSCULAR; INTRAVENOUS; SUBCUTANEOUS at 16:00

## 2019-08-28 RX ADMIN — HEPARIN SODIUM 5000 UNIT(S): 5000 INJECTION INTRAVENOUS; SUBCUTANEOUS at 22:18

## 2019-08-28 RX ADMIN — Medication 400 MILLIGRAM(S): at 21:43

## 2019-08-28 RX ADMIN — SODIUM CHLORIDE 100 MILLILITER(S): 9 INJECTION INTRAMUSCULAR; INTRAVENOUS; SUBCUTANEOUS at 20:16

## 2019-08-28 RX ADMIN — Medication 40 MILLIEQUIVALENT(S): at 18:22

## 2019-08-28 RX ADMIN — PIPERACILLIN AND TAZOBACTAM 200 GRAM(S): 4; .5 INJECTION, POWDER, LYOPHILIZED, FOR SOLUTION INTRAVENOUS at 18:22

## 2019-08-28 RX ADMIN — Medication 250 MILLIGRAM(S): at 22:18

## 2019-08-28 RX ADMIN — Medication 650 MILLIGRAM(S): at 20:46

## 2019-08-28 RX ADMIN — Medication 100 MILLIGRAM(S): at 20:51

## 2019-08-28 NOTE — H&P ADULT - NSHPLABSRESULTS_GEN_ALL_CORE
11.1   14.0  )-----------( 229      ( 28 Aug 2019 16:28 )             34.8                   131<L>  |  97  |  12  ----------------------------<  110<H>  3.2<L>   |  20<L>  |  0.86    Ca    9.8      28 Aug 2019 16:28  Mg     1.8         TPro  8.1  /  Alb  3.9  /  TBili  0.3  /  DBili  x   /  AST  131<H>  /  ALT  70<H>  /  AlkPhos  90                         Urinalysis Basic - ( 27 Aug 2019 00:20 )    Color: YELLOW / Appearance: Lt TURBID / S.016 / pH: 6.0  Gluc: NEGATIVE / Ketone: MODERATE  / Bili: NEGATIVE / Urobili: TRACE   Blood: LARGE / Protein: 300 / Nitrite: NEGATIVE   Leuk Esterase: NEGATIVE / RBC: 3-5 / WBC 11-25   Sq Epi: FEW / Non Sq Epi: x / Bacteria: NEGATIVE    < from: CT Head No Cont (19 @ 17:15) >    IMPRESSION:     No interval change from prior, mild volume loss and microvascular   disease, small cerebellum with prominent CSF spaces and decreased   mammillary body size, correlate with EtOH use and thiamine vitamin B1   levels.    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 No new   hemorrhage or midline shift. Basal cisterns remain patent. If symptoms   persist, consider follow-up head CT or MR if no contraindications.      < end of copied text >

## 2019-08-28 NOTE — H&P ADULT - HISTORY OF PRESENT ILLNESS
Pt is a 60 y/o Female with pmhx of HTN, DM presents to ER c/o of dizziness, "shaking" and fevers x1week.  "im in alcohol withdrawal".  patient reports was on a 5 day briggs where she drank a bottle of red wine nightly, although admits to drinking a bottle of wine most nights of the week.  The day after began feeling shaky, and having fevers, admit she does feel like that sometimes after drinking but able to control "withdrawal" with Tylenol PM.  Reports symptoms persisted so went to [facility] 2 days ago and was diagnosed with UTi- has been taking keflex for symptoms.  Reports daily fevers - Tmax 103 Reports still feeling dizziness and chills - head swinging sensation.  Denies acute visual changes, numbness/tinglng, falls, head trauma, abdominal pain, n/v.

## 2019-08-28 NOTE — ED PROVIDER NOTE - OBJECTIVE STATEMENT
60yo F pmhx of HTN, DM presents to ER c/o of dizziness, "shaking" and fevers x1week.  "im in alcohol withdrawal".  patient reports was on a 5 day briggs where she drank a bottle of red wine nightly, although admits to drinking a bottle of wine most nights of the week.  The day after began feeling shaky, and having fevers, admit she does feel like that sometimes after drinking but able to control "withdrawal" with Tylenol PM.  Reports symptoms persisted so went to [facility] 2 days ago and was diagnosed with UTi- has been taking keflex for symptoms.  Reports still feeling dizziness - head swinging sensation.  Denies acute visual changes, numbness/tinglng, falls, head trauma, abdominal pain, n/v. 60yo F pmhx of HTN, DM presents to ER c/o of dizziness, "shaking" and fevers x1week.  "im in alcohol withdrawal".  patient reports was on a 5 day briggs where she drank a bottle of red wine nightly, although admits to drinking a bottle of wine most nights of the week.  The day after began feeling shaky, and having fevers, admit she does feel like that sometimes after drinking but able to control "withdrawal" with Tylenol PM.  Reports symptoms persisted so went to [facility] 2 days ago and was diagnosed with UTi- has been taking keflex for symptoms.  Reports daily fevers - Tmax 103 Reports still feeling dizziness - head swinging sensation.  Denies acute visual changes, numbness/tinglng, falls, head trauma, abdominal pain, n/v.

## 2019-08-28 NOTE — PROVIDER CONTACT NOTE (OTHER) - BACKGROUND
pt admitted to ER c/o shakiness/ dizziness 1 week. dx of sepsis. hx of htn, anemia, etoh use, elevated LFT's

## 2019-08-28 NOTE — ED PROVIDER NOTE - CLINICAL SUMMARY MEDICAL DECISION MAKING FREE TEXT BOX
Adult female w hx of etoh pw complains of fevers and feeling shakey. Tmax 103 seen prviously at [facility] w dx of uti and dc symptoms continued with neg cultures urine/blood. PT arrived with tachycardia but without fever. ? mild wihtdrawl. infections. plan admit fever workup. observe  for sign of withdrawl.  Dean Sierra MD, Facep

## 2019-08-28 NOTE — H&P ADULT - NSHPREVIEWOFSYSTEMS_GEN_ALL_CORE
REVIEW OF SYSTEMS:    CONSTITUTIONAL: + weakness, fever and chills   EYES/ENT: No visual changes;  No vertigo or throat pain   NECK: No pain or stiffness  RESPIRATORY: No cough, wheezing, hemoptysis; No shortness of breath  CARDIOVASCULAR: No chest pain or palpitations  GASTROINTESTINAL: No abdominal or epigastric pain. No nausea, vomiting, or hematemesis; No diarrhea or constipation. No melena or hematochezia.  GENITOURINARY: No dysuria, frequency or hematuria  NEUROLOGICAL: No numbness or weakness  SKIN: No itching, burning, rashes, or lesions   All other review of systems is negative unless indicated above.

## 2019-08-28 NOTE — ED PROVIDER NOTE - ATTENDING CONTRIBUTION TO CARE
Private Physician Shameka reid staff  59y female pmh HTN, DM,ETOH, Pt was drinking heavy last week. Stopped and had complains of dizziness with sweats,feeling shakey with chills and fever to 103. Was seen yesterday at [facility] treated for UTI and given ivf, and dc home. No .palps.NV, Has hx of prior mild withdrawl symptoms no hx of seizure. PE WDWN female awake alert normocephalic atraumatic neck supple chest clear anterior & posterior abd soft +bs no mass guarding, Neuro gcs 15 pain light touch intact, cn 2-12 intact, cv no rubs, gallops or murmurs  Dean Sierra MD, Facep

## 2019-08-28 NOTE — H&P ADULT - PROBLEM SELECTOR PLAN 1
unknown origin  check procal and lactate   CT abd/Pelv and chest tomorrow   trend fever  broad spectrum Abx in the ER, will hold and monitor for recurrent fever  pan cx   Echocardiogram, no Hx of Drug use

## 2019-08-28 NOTE — ED ADULT NURSE NOTE - OBJECTIVE STATEMENT
60 y/o female A&Ox4, PMH HTN, ETOH abuse; endorses 1 bottle of red wine/night, last drink 1 week ago, presents to ED with c/o fevers/chills, nausea/diarrhea, decreased PO intake, SOB worsening x1 week. Pt states she's had symptoms of withdrawal that keep her awake at night despite taking motrin and tylenol alternating each every 3 hours. Pt states she is "sweating through the sheets" and "shaking uncontrollably... unable to sleep." Pt coming to ED for further evaluation. Upon further assessment, airway patent and intact, denies chest pain, intermittent SOB. ABD soft nontender, denies blood in urine and/or stool. Skin intact. Peripheral pulses strong and normal baseline sensation present x4. Safety and comfort measures maintained.

## 2019-08-28 NOTE — H&P ADULT - ASSESSMENT
Pt is a 60 y/o Female with pmhx of HTN, DM presents to ER c/o of dizziness, "shaking" and fevers x1week.

## 2019-08-28 NOTE — ED PROVIDER NOTE - CARE PLAN
Principal Discharge DX:	Sepsis, due to unspecified organism Principal Discharge DX:	Fever, unspecified fever cause

## 2019-08-29 LAB
ALBUMIN SERPL ELPH-MCNC: 3.4 G/DL — SIGNIFICANT CHANGE UP (ref 3.3–5)
ALP SERPL-CCNC: 81 U/L — SIGNIFICANT CHANGE UP (ref 40–120)
ALT FLD-CCNC: 82 U/L — HIGH (ref 10–45)
ANION GAP SERPL CALC-SCNC: 13 MMOL/L — SIGNIFICANT CHANGE UP (ref 5–17)
APPEARANCE UR: CLEAR — SIGNIFICANT CHANGE UP
AST SERPL-CCNC: 136 U/L — HIGH (ref 10–40)
BACTERIA # UR AUTO: NEGATIVE — SIGNIFICANT CHANGE UP
BILIRUB SERPL-MCNC: 0.4 MG/DL — SIGNIFICANT CHANGE UP (ref 0.2–1.2)
BILIRUB UR-MCNC: NEGATIVE — SIGNIFICANT CHANGE UP
BUN SERPL-MCNC: 8 MG/DL — SIGNIFICANT CHANGE UP (ref 7–23)
CALCIUM SERPL-MCNC: 9.3 MG/DL — SIGNIFICANT CHANGE UP (ref 8.4–10.5)
CHLORIDE SERPL-SCNC: 104 MMOL/L — SIGNIFICANT CHANGE UP (ref 96–108)
CHOLEST SERPL-MCNC: 144 MG/DL — SIGNIFICANT CHANGE UP (ref 10–199)
CO2 SERPL-SCNC: 20 MMOL/L — LOW (ref 22–31)
COLOR SPEC: SIGNIFICANT CHANGE UP
CREAT SERPL-MCNC: 0.68 MG/DL — SIGNIFICANT CHANGE UP (ref 0.5–1.3)
DIFF PNL FLD: ABNORMAL
EPI CELLS # UR: 1 /HPF — SIGNIFICANT CHANGE UP
FERRITIN SERPL-MCNC: 527 NG/ML — HIGH (ref 15–150)
FOLATE SERPL-MCNC: >20 NG/ML — SIGNIFICANT CHANGE UP
GLUCOSE SERPL-MCNC: 93 MG/DL — SIGNIFICANT CHANGE UP (ref 70–99)
GLUCOSE UR QL: NEGATIVE — SIGNIFICANT CHANGE UP
HBA1C BLD-MCNC: 5.8 % — HIGH (ref 4–5.6)
HCT VFR BLD CALC: 31.4 % — LOW (ref 34.5–45)
HCV AB S/CO SERPL IA: 0.12 S/CO — SIGNIFICANT CHANGE UP (ref 0–0.99)
HCV AB SERPL-IMP: SIGNIFICANT CHANGE UP
HDLC SERPL-MCNC: 34 MG/DL — LOW
HGB BLD-MCNC: 10.6 G/DL — LOW (ref 11.5–15.5)
HYALINE CASTS # UR AUTO: 1 /LPF — SIGNIFICANT CHANGE UP (ref 0–2)
IRON SATN MFR SERPL: 17 UG/DL — LOW (ref 30–160)
IRON SATN MFR SERPL: 7 % — LOW (ref 14–50)
KETONES UR-MCNC: SIGNIFICANT CHANGE UP
LEUKOCYTE ESTERASE UR-ACNC: NEGATIVE — SIGNIFICANT CHANGE UP
LIPID PNL WITH DIRECT LDL SERPL: 87 MG/DL — SIGNIFICANT CHANGE UP
MAGNESIUM SERPL-MCNC: 1.9 MG/DL — SIGNIFICANT CHANGE UP (ref 1.6–2.6)
MCHC RBC-ENTMCNC: 29.2 PG — SIGNIFICANT CHANGE UP (ref 27–34)
MCHC RBC-ENTMCNC: 33.8 GM/DL — SIGNIFICANT CHANGE UP (ref 32–36)
MCV RBC AUTO: 86.5 FL — SIGNIFICANT CHANGE UP (ref 80–100)
NITRITE UR-MCNC: NEGATIVE — SIGNIFICANT CHANGE UP
PH UR: 6 — SIGNIFICANT CHANGE UP (ref 5–8)
PLATELET # BLD AUTO: 238 K/UL — SIGNIFICANT CHANGE UP (ref 150–400)
POTASSIUM SERPL-MCNC: 3.5 MMOL/L — SIGNIFICANT CHANGE UP (ref 3.5–5.3)
POTASSIUM SERPL-SCNC: 3.5 MMOL/L — SIGNIFICANT CHANGE UP (ref 3.5–5.3)
PROCALCITONIN SERPL-MCNC: 0.1 NG/ML — SIGNIFICANT CHANGE UP (ref 0.02–0.1)
PROT SERPL-MCNC: 7.2 G/DL — SIGNIFICANT CHANGE UP (ref 6–8.3)
PROT UR-MCNC: SIGNIFICANT CHANGE UP
RAPID RVP RESULT: SIGNIFICANT CHANGE UP
RBC # BLD: 3.63 M/UL — LOW (ref 3.8–5.2)
RBC # FLD: 15.4 % — HIGH (ref 10.3–14.5)
RBC CASTS # UR COMP ASSIST: 8 /HPF — HIGH (ref 0–4)
SODIUM SERPL-SCNC: 137 MMOL/L — SIGNIFICANT CHANGE UP (ref 135–145)
SP GR SPEC: 1.01 — LOW (ref 1.01–1.02)
TIBC SERPL-MCNC: 231 UG/DL — SIGNIFICANT CHANGE UP (ref 220–430)
TOTAL CHOLESTEROL/HDL RATIO MEASUREMENT: 4.2 RATIO — SIGNIFICANT CHANGE UP (ref 3.3–7.1)
TRIGL SERPL-MCNC: 113 MG/DL — SIGNIFICANT CHANGE UP (ref 10–149)
TSH SERPL-MCNC: 3.93 UIU/ML — SIGNIFICANT CHANGE UP (ref 0.27–4.2)
UIBC SERPL-MCNC: 214 UG/DL — SIGNIFICANT CHANGE UP (ref 110–370)
UROBILINOGEN FLD QL: NEGATIVE — SIGNIFICANT CHANGE UP
VIT B12 SERPL-MCNC: 863 PG/ML — SIGNIFICANT CHANGE UP (ref 232–1245)
WBC # BLD: 10.94 K/UL — HIGH (ref 3.8–10.5)
WBC # FLD AUTO: 10.94 K/UL — HIGH (ref 3.8–10.5)
WBC UR QL: 1 /HPF — SIGNIFICANT CHANGE UP (ref 0–5)

## 2019-08-29 PROCEDURE — 74177 CT ABD & PELVIS W/CONTRAST: CPT | Mod: 26

## 2019-08-29 PROCEDURE — 71260 CT THORAX DX C+: CPT | Mod: 26

## 2019-08-29 PROCEDURE — 93306 TTE W/DOPPLER COMPLETE: CPT | Mod: 26

## 2019-08-29 RX ORDER — VANCOMYCIN HCL 1 G
1000 VIAL (EA) INTRAVENOUS EVERY 12 HOURS
Refills: 0 | Status: DISCONTINUED | OUTPATIENT
Start: 2019-08-29 | End: 2019-08-30

## 2019-08-29 RX ORDER — SENNA PLUS 8.6 MG/1
1 TABLET ORAL AT BEDTIME
Refills: 0 | Status: DISCONTINUED | OUTPATIENT
Start: 2019-08-29 | End: 2019-09-03

## 2019-08-29 RX ORDER — PIPERACILLIN AND TAZOBACTAM 4; .5 G/20ML; G/20ML
3.38 INJECTION, POWDER, LYOPHILIZED, FOR SOLUTION INTRAVENOUS EVERY 8 HOURS
Refills: 0 | Status: DISCONTINUED | OUTPATIENT
Start: 2019-08-29 | End: 2019-08-30

## 2019-08-29 RX ORDER — NICOTINE POLACRILEX 2 MG
1 GUM BUCCAL DAILY
Refills: 0 | Status: DISCONTINUED | OUTPATIENT
Start: 2019-08-29 | End: 2019-09-03

## 2019-08-29 RX ORDER — LOSARTAN POTASSIUM 100 MG/1
100 TABLET, FILM COATED ORAL DAILY
Refills: 0 | Status: DISCONTINUED | OUTPATIENT
Start: 2019-08-29 | End: 2019-09-03

## 2019-08-29 RX ORDER — LOSARTAN POTASSIUM 100 MG/1
100 TABLET, FILM COATED ORAL DAILY
Refills: 0 | Status: DISCONTINUED | OUTPATIENT
Start: 2019-08-29 | End: 2019-08-29

## 2019-08-29 RX ORDER — DOCUSATE SODIUM 100 MG
100 CAPSULE ORAL DAILY
Refills: 0 | Status: DISCONTINUED | OUTPATIENT
Start: 2019-08-29 | End: 2019-09-01

## 2019-08-29 RX ORDER — AZITHROMYCIN 500 MG/1
500 TABLET, FILM COATED ORAL EVERY 24 HOURS
Refills: 0 | Status: DISCONTINUED | OUTPATIENT
Start: 2019-08-29 | End: 2019-08-30

## 2019-08-29 RX ORDER — LANOLIN ALCOHOL/MO/W.PET/CERES
3 CREAM (GRAM) TOPICAL ONCE
Refills: 0 | Status: COMPLETED | OUTPATIENT
Start: 2019-08-29 | End: 2019-08-29

## 2019-08-29 RX ORDER — AMLODIPINE BESYLATE 2.5 MG/1
5 TABLET ORAL DAILY
Refills: 0 | Status: DISCONTINUED | OUTPATIENT
Start: 2019-08-29 | End: 2019-09-03

## 2019-08-29 RX ADMIN — Medication 3 MILLIGRAM(S): at 21:10

## 2019-08-29 RX ADMIN — Medication 100 MILLIGRAM(S): at 21:10

## 2019-08-29 RX ADMIN — Medication 250 MILLIGRAM(S): at 22:46

## 2019-08-29 RX ADMIN — PIPERACILLIN AND TAZOBACTAM 25 GRAM(S): 4; .5 INJECTION, POWDER, LYOPHILIZED, FOR SOLUTION INTRAVENOUS at 17:51

## 2019-08-29 RX ADMIN — HEPARIN SODIUM 5000 UNIT(S): 5000 INJECTION INTRAVENOUS; SUBCUTANEOUS at 21:10

## 2019-08-29 RX ADMIN — AMLODIPINE BESYLATE 5 MILLIGRAM(S): 2.5 TABLET ORAL at 10:48

## 2019-08-29 RX ADMIN — Medication 1 PATCH: at 11:34

## 2019-08-29 RX ADMIN — PANTOPRAZOLE SODIUM 40 MILLIGRAM(S): 20 TABLET, DELAYED RELEASE ORAL at 06:15

## 2019-08-29 RX ADMIN — SENNA PLUS 1 TABLET(S): 8.6 TABLET ORAL at 21:10

## 2019-08-29 RX ADMIN — AZITHROMYCIN 250 MILLIGRAM(S): 500 TABLET, FILM COATED ORAL at 16:23

## 2019-08-29 RX ADMIN — Medication 100 MILLIGRAM(S): at 11:34

## 2019-08-29 RX ADMIN — LOSARTAN POTASSIUM 100 MILLIGRAM(S): 100 TABLET, FILM COATED ORAL at 10:48

## 2019-08-29 RX ADMIN — HEPARIN SODIUM 5000 UNIT(S): 5000 INJECTION INTRAVENOUS; SUBCUTANEOUS at 13:24

## 2019-08-29 RX ADMIN — HEPARIN SODIUM 5000 UNIT(S): 5000 INJECTION INTRAVENOUS; SUBCUTANEOUS at 06:16

## 2019-08-29 RX ADMIN — Medication 1 MILLIGRAM(S): at 11:34

## 2019-08-29 NOTE — CONSULT NOTE ADULT - ASSESSMENT
Sepsis:      LLL pna:      Transaminitis: Pt is a 58 y/o Female with pmhx of HTN, DM presents to ER c/o of dizziness, "shaking" and fevers x1week.  "im in alcohol withdrawal".  patient reports was on a 5 day briggs where she drank a bottle of red wine nightly, although admits to drinking a bottle of wine most nights of the week.  The day after began feeling shaky, and having fevers, admit she does feel like that sometimes after drinking but able to control "withdrawal" with Tylenol PM.  Reports symptoms persisted so went to [facility] 2 days ago and was diagnosed with UTi- has been taking keflex for symptoms.  Reports daily fevers - Tmax 103 Reports still feeling dizziness and chills - head swinging sensation.  Denies acute visual changes, numbness/tinglng, falls, head trauma, abdominal pain, n/v. (28 Aug 2019 18:47)    ER vitals:  Tm 102.2, P 103, BP 95/56.  WBC 14.1 --> 10.9.  LFTs elevated (AST > ALT).  Blood alcohol neg.  uA (-) nit/LE.  CXR LLL pna.   Pt given dose of vanco/zosyn.  Pt states she was recently treated for UTI, and was having difficulty urinating, requiring her strain.  Pt states she has chronic cough from smoking, has not noticed any worsening, no sputum production, cp, sob, or pleurisy.  Pt was recently visiting her granddaughter in [place], during which time she took her granddaughter to ER for high fevers.      ID consult called for further abx managment.     Sepsis:    - Pt a/w fever, mild tachycardia, leukocytosis. Suspect source is LLL pna as seen on cxr imaging.  Cont broad spectrum abx to treat infectious focus.    - f/u  blood cx, urine cx, UA.      - Monitor lactate, check procalcitonin.  Monitor pulse ox and hemodynamic status.  Pt is currently clinically stable.      - f/u diagnostic imaging studies to r/o alternate source of infection - CT c/a/p ordered.      LLL pna:    - Cxr (+) LLL patchy opacity.  Pt states she has chronic cough due to h/o smoking.  No sputum production.  Recommend CT chest for further evaluation    - Cont broad spectrum abx to cover for HCAP (recent ER admission) and atypical organisms.  Cont vanco/zosyn/azithro.  f/u vanco trough.    - RVP (-).  check Legionella Ag.      Transaminitis:    - AST > ALT, possible from ETOH.  f/u ctap.    Trend LFTS.      - Check hepatitis panel, ebv, cmv      Will follow,    d/w pt at bedside.       Anne Roberosn  [phone]

## 2019-08-29 NOTE — CONSULT NOTE ADULT - SUBJECTIVE AND OBJECTIVE BOX
CHIEF COMPLAINT:Patient is a 59y old  Female who presents with a chief complaint of fever (29 Aug 2019 17:43)      HISTORY OF PRESENT ILLNESS:HPI:  Pt is a 60 y/o Female with pmhx of HTN, DM presents to ER c/o of dizziness, "shaking" and fevers x1week.  "im in alcohol withdrawal".  patient reports was on a 5 day briggs where she drank a bottle of red wine nightly, although admits to drinking a bottle of wine most nights of the week.  The day after began feeling shaky, and having fevers, admit she does feel like that sometimes after drinking but able to control "withdrawal" with Tylenol PM.  Reports symptoms persisted so went to [facility] 2 days ago and was diagnosed with UTi- has been taking keflex for symptoms.  Reports daily fevers - Tmax 103 Reports still feeling dizziness and chills - head swinging sensation.  Denies acute visual changes, numbness/tinglng, falls, head trauma, abdominal pain, n/v. (28 Aug 2019 18:47)      PAST MEDICAL & SURGICAL HISTORY:  Hypertension  H/O tubal ligation          MEDICATIONS:  amLODIPine   Tablet 5 milliGRAM(s) Oral daily  heparin  Injectable 5000 Unit(s) SubCutaneous every 8 hours  losartan 100 milliGRAM(s) Oral daily    azithromycin  IVPB 500 milliGRAM(s) IV Intermittent every 24 hours  piperacillin/tazobactam IVPB.. 3.375 Gram(s) IV Intermittent every 8 hours  vancomycin  IVPB 1000 milliGRAM(s) IV Intermittent every 12 hours        docusate sodium 100 milliGRAM(s) Oral daily  pantoprazole    Tablet 40 milliGRAM(s) Oral before breakfast  senna 1 Tablet(s) Oral at bedtime      folic acid 1 milliGRAM(s) Oral daily  sodium chloride 0.9%. 1000 milliLiter(s) IV Continuous <Continuous>  thiamine 100 milliGRAM(s) Oral daily      FAMILY HISTORY:  No pertinent family history in first degree relatives      Non-contributory    SOCIAL HISTORY:    not a smoker    Allergies    penicillin (Swelling)    Intolerances    	    REVIEW OF SYSTEMS:   CONSTITUTIONAL: + fever, night sweats  EYES: No eye pain, visual disturbances, or discharge  ENMT:  No difficulty hearing, tinnitus  NECK: No pain or stiffness  RESPIRATORY: +cough, no wheezing,  CARDIOVASCULAR: No chest pain, palpitations, passing out, or leg swelling, +dizziness,  GASTROINTESTINAL:  No nausea, vomiting, diarrhea or constipation. No melena.  GENITOURINARY: No dysuria, hematuria  NEUROLOGICAL: No stroke like symptoms  SKIN: No burning or lesions   ENDOCRINE: No heat or cold intolerance  MUSCULOSKELETAL: No joint pain or swelling  PSYCHIATRIC: No  anxiety, mood swings  HEME/LYMPH: No bleeding gums  ALLERY AND IMMUNOLOGIC: No hives or eczema	    All other ROS negative    PHYSICAL EXAM:  T(C): 37.3 (08-29-19 @ 21:08), Max: 37.6 (08-29-19 @ 13:40)  HR: 103 (08-29-19 @ 21:08) (79 - 103)  BP: 113/70 (08-29-19 @ 21:08) (92/51 - 124/74)  RR: 18 (08-29-19 @ 21:08) (18 - 18)  SpO2: 95% (08-29-19 @ 21:08) (94% - 97%)  Wt(kg): --  I&O's Summary    28 Aug 2019 07:01  -  29 Aug 2019 07:00  --------------------------------------------------------  IN: 1870 mL / OUT: 0 mL / NET: 1870 mL    29 Aug 2019 07:01  -  29 Aug 2019 23:03  --------------------------------------------------------  IN: 660 mL / OUT: 300 mL / NET: 360 mL        Appearance: Normal	  HEENT:   Normal oral mucosa, EOMI	  Cardiovascular: Normal S1 S2, No JVD, No murmurs  Respiratory: Lungs clear to auscultation	  Psychiatry: Alert  Gastrointestinal:  Soft, Non-tender, + BS	  Skin: No rashes   Neurologic: Non-focal  Extremities:  No edema  Vascular: Peripheral pulses palpable    	    	  	  CARDIAC MARKERS:  Labs personally reviewed by me                                  10.6   10.94 )-----------( 238      ( 29 Aug 2019 09:24 )             31.4     08-29    137  |  104  |  8   ----------------------------<  93  3.5   |  20<L>  |  0.68    Ca    9.3      29 Aug 2019 06:21  Mg     1.9     08-29    TPro  7.2  /  Alb  3.4  /  TBili  0.4  /  DBili  0.1  /  AST  136<H>  /  ALT  82<H>  /  AlkPhos  81  08-29          EKG: Personally reviewed by me - nsr with junctional ST dep  Radiology: Personally reviewed by me - cxr left Lower lobe PNA    Assessment and Plan:   · Assessment		  Pt is a 60 y/o Female with pmhx of HTN, DM presents to ER c/o of dizziness, "shaking" and fevers x1week.     Problem/Plan - 1:  ·  Problem: Fevers.  Plan: Pneumonia likely   CT abd/Pelv and chest pending   TTE to rule out endocardtis      Problem/Plan - 2:  ·  Problem: ETOH abuse.  Plan: CIWA protocol PRN, currently no tremors or AMS   IV fluid   monitor for fall.     Problem/Plan - 3:  ·  Problem: Anemia.  Plan: chronic disease   Anemia W/U.     Problem/Plan - 4:  ·  Problem: Electrolyte imbalance.  Plan: replace all electrolytes  Nephro eval appreciated.     Problem/Plan - 5:  Problem: HTN (hypertension). Plan: norvasc 5 and valsartan, can switch to losartan 100   monitor BP and titrate as tolerated.      Phillip Melara DO [facility]  Cardiovascular Medicine  [phone]

## 2019-08-29 NOTE — CONSULT NOTE ADULT - SUBJECTIVE AND OBJECTIVE BOX
Patient is a 59y old  Female who presents with a chief complaint of fever (29 Aug 2019 11:26)      HPI:    Pt is a 60 y/o Female with pmhx of HTN, DM presents to ER c/o of dizziness, "shaking" and fevers x1week.  "im in alcohol withdrawal".  patient reports was on a 5 day briggs where she drank a bottle of red wine nightly, although admits to drinking a bottle of wine most nights of the week.  The day after began feeling shaky, and having fevers, admit she does feel like that sometimes after drinking but able to control "withdrawal" with Tylenol PM.  Reports symptoms persisted so went to [facility] 2 days ago and was diagnosed with UTi- has been taking keflex for symptoms.  Reports daily fevers - Tmax 103 Reports still feeling dizziness and chills - head swinging sensation.  Denies acute visual changes, numbness/tinglng, falls, head trauma, abdominal pain, n/v. (28 Aug 2019 18:47)    ER vitals:  Tm 102.2, P 103, BP 95/56.  WBC 14.1 --> 10.9.  LFTs elevated (AST > ALT).  Blood alcohol neg.  uA (-) nit/LE.  CXR LLL pna.   Pt given dose of vanco/zosyn.  ID consult called for further abx managment.       REVIEW OF SYSTEMS:    CONSTITUTIONAL: No fever, weight loss, or fatigue  EYES: No eye pain, visual disturbances, or discharge  ENMT:  No sore throat  NECK: No pain or stiffness  RESPIRATORY: No cough, wheezing, chills or hemoptysis; No shortness of breath  CARDIOVASCULAR: No chest pain, palpitations, dizziness, or leg swelling  GASTROINTESTINAL: No abdominal or epigastric pain. No nausea, vomiting, or hematemesis; No diarrhea or constipation. No melena or hematochezia.  GENITOURINARY: No dysuria, frequency, hematuria, or incontinence  NEUROLOGICAL: No headaches, memory loss, loss of strength, numbness, or tremors  SKIN: No itching, burning, rashes, or lesions   LYMPH NODES: No enlarged glands  MUSCULOSKELETAL: No joint pain or swelling; No muscle, back, or extremity pain      PAST MEDICAL & SURGICAL HISTORY:  Hypertension  H/O tubal ligation      Allergies    penicillin (Swelling)    Intolerances        FAMILY HISTORY:  No pertinent family history in first degree relatives      SOCIAL HISTORY:        MEDICATIONS  (STANDING):  amLODIPine   Tablet 5 milliGRAM(s) Oral daily  folic acid 1 milliGRAM(s) Oral daily  heparin  Injectable 5000 Unit(s) SubCutaneous every 8 hours  losartan 100 milliGRAM(s) Oral daily  nicotine - 21 mG/24Hr(s) Patch 1 patch Transdermal daily  pantoprazole    Tablet 40 milliGRAM(s) Oral before breakfast  sodium chloride 0.9%. 1000 milliLiter(s) (100 mL/Hr) IV Continuous <Continuous>  thiamine 100 milliGRAM(s) Oral daily    MEDICATIONS  (PRN):      Vital Signs Last 24 Hrs  T(C): 37 (29 Aug 2019 10:03), Max: 39 (28 Aug 2019 21:19)  T(F): 98.6 (29 Aug 2019 10:03), Max: 102.2 (28 Aug 2019 21:19)  HR: 95 (29 Aug 2019 10:03) (79 - 111)  BP: 124/74 (29 Aug 2019 10:03) (92/51 - 124/74)  BP(mean): --  RR: 18 (29 Aug 2019 10:03) (18 - 22)  SpO2: 97% (29 Aug 2019 10:03) (93% - 97%)    PHYSICAL EXAM:    GENERAL: NAD, well-groomed  HEAD:  Atraumatic, Normocephalic  EYES: EOMI, PERRLA, conjunctiva and sclera clear  ENMT: No tonsillar erythema, exudates, or enlargement; Moist mucous membranes  NECK: Supple, No JVD  CHEST/LUNG: Clear to percussion bilaterally; No rales, rhonchi, wheezing, or rubs  HEART: Regular rate and rhythm; No murmurs, rubs, or gallops  ABDOMEN: Soft, Nontender, Nondistended; Bowel sounds present  EXTREMITIES:  2+ Peripheral Pulses, No clubbing, cyanosis, or edema  LYMPH: No lymphadenopathy noted  SKIN: No rashes or lesions    LABS:  CBC Full  -  ( 29 Aug 2019 09:24 )  WBC Count : 10.94 K/uL  RBC Count : 3.63 M/uL  Hemoglobin : 10.6 g/dL  Hematocrit : 31.4 %  Platelet Count - Automated : 238 K/uL  Mean Cell Volume : 86.5 fl  Mean Cell Hemoglobin : 29.2 pg  Mean Cell Hemoglobin Concentration : 33.8 gm/dL  Auto Neutrophil # : x  Auto Lymphocyte # : x  Auto Monocyte # : x  Auto Eosinophil # : x  Auto Basophil # : x  Auto Neutrophil % : x  Auto Lymphocyte % : x  Auto Monocyte % : x  Auto Eosinophil % : x  Auto Basophil % : x          137  |  104  |  8   ----------------------------<  93  3.5   |  20<L>  |  0.68    Ca    9.3      29 Aug 2019 06:21  Mg     1.9         TPro  7.2  /  Alb  3.4  /  TBili  0.4  /  DBili  0.1  /  AST  136<H>  /  ALT  82<H>  /  AlkPhos  81        LIVER FUNCTIONS - ( 29 Aug 2019 06:21 )  Alb: 3.4 g/dL / Pro: 7.2 g/dL / ALK PHOS: 81 U/L / ALT: 82 U/L / AST: 136 U/L / GGT: x                               MICROBIOLOGY:        Urinalysis Basic - ( 29 Aug 2019 06:21 )    Color: Light Yellow / Appearance: Clear / S.007 / pH: x  Gluc: x / Ketone: Trace  / Bili: Negative / Urobili: Negative   Blood: x / Protein: Trace / Nitrite: Negative   Leuk Esterase: Negative / RBC: 8 /hpf / WBC 1 /HPF   Sq Epi: x / Non Sq Epi: 1 /hpf / Bacteria: Negative      Culture - Blood (19 @ 00:50)    Culture - Blood:   NO ORGANISMS ISOLATED  NO ORGANISMS ISOLATED AT 48 HRS.    Specimen Source: BLOOD VENOUS    Culture - Blood (19 @ 00:50)    Culture - Blood:   NO ORGANISMS ISOLATED  NO ORGANISMS ISOLATED AT 48 HRS.    Specimen Source: BLOOD PERIPHERAL    Culture - Urine (19 @ 00:46)    Culture - Urine:   NO GROWTH AT 24 HOURS    Specimen Source: URINE MIDSTREAM    Urinalysis + Microscopic Examination (19 @ 06:21)    Urine Appearance: Clear    Urobilinogen: Negative    Specific Gravity: 1.007    Protein, Urine: Trace    pH Urine: 6.0    Leukocyte Esterase Concentration: Negative    Nitrite: Negative    Ketone - Urine: Trace    Bilirubin: Negative    Color: Light Yellow    Glucose Qualitative, Urine: Negative    Blood, Urine: Moderate    Red Blood Cell - Urine: 8 /hpf    White Blood Cell - Urine: 1 /HPF    Epithelial Cells: 1 /hpf    Hyaline Casts: 1 /lpf    Bacteria: Negative    Rapid Respiratory Viral Panel (19 @ 06:21)    Rapid RVP Result: NotDete: This Respiratory Panel uses polymerase chain reaction (PCR) to detect for  adenovirus; coronavirus (HKU1, NL63, 229E, OC43); human metapneumovirus  (hMPV); human enterovirus/rhinovirus (Entero/RV); influenza A; influenza  A/H1; influenza A/H3; influenza A/H1-2009; influenza B; parainfluenza  viruses 1, 2, 3, 4; respiratory syncytial virus; Mycoplasma pneumoniae;  and Chlamydophila pneumoniae.              RADIOLOGY:      < from: Xray Chest 2 Views PA/Lat (19 @ 18:38) >  FINDINGS:    Patchy consolidation left lower lobe.  There is no evidence of pleural effusion or pneumothorax.  The cardiomediastinal silhouette cannot be accurately assessed in this   projection.  The visualized osseous and soft tissue structures demonstrate no acute   pathology.    IMPRESSION:   Left lower lobe pneumonia.    < end of copied text >        < from: CT Head No Cont (19 @ 17:15) >  FINDINGS:     There is mild enlargement ventricles and sulci consistent with minimal   volume loss. There are nonspecific areas of decreased aeration white   matter, likely sequela microvascular disease. There is no  acute intra or   extra axial hemorrhage or midline shift. Cerebellum is decreased in size   with prominent CSF spaces, there are small mammillary bodies, correlate   with EtOH usage and thiamine vitamin B-1 levels. Partially empty sella.   The basal cisterns are patent. Orbits are unremarkable. Minimal paranasal   sinus mucosal thickening,, mastoid air cells appear free disease.     There is a 1 x 2.2 x 1.9 cm, AP, TR, CC calcific foci extending posterior   from the right midline occipital bone, (4:11), likely osteoma, and 7.2 x   4.7 x 5.7 mm, AP, TR, CC, increased bony density at the right petrous   apex, suggesting multiple osteomas, multiple osteomas can be associated   with Arias syndrome.       IMPRESSION:     No interval change from prior, mild volume loss and microvascular   disease, small cerebellum with prominent CSF spaces and decreased   mammillary body size, correlate with EtOH use and thiamine vitamin B1   levels.    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 No new   hemorrhage or midline shift. Basal cisterns remain patent. If symptoms   persist, consider follow-up head CT or MR if no contraindications.      < end of copied text > Patient is a 59y old  Female who presents with a chief complaint of fever (29 Aug 2019 11:26)      HPI:    Pt is a 58 y/o Female with pmhx of HTN, DM presents to ER c/o of dizziness, "shaking" and fevers x1week.  "im in alcohol withdrawal".  patient reports was on a 5 day briggs where she drank a bottle of red wine nightly, although admits to drinking a bottle of wine most nights of the week.  The day after began feeling shaky, and having fevers, admit she does feel like that sometimes after drinking but able to control "withdrawal" with Tylenol PM.  Reports symptoms persisted so went to [facility] 2 days ago and was diagnosed with UTi- has been taking keflex for symptoms.  Reports daily fevers - Tmax 103 Reports still feeling dizziness and chills - head swinging sensation.  Denies acute visual changes, numbness/tinglng, falls, head trauma, abdominal pain, n/v. (28 Aug 2019 18:47)    ER vitals:  Tm 102.2, P 103, BP 95/56.  WBC 14.1 --> 10.9.  LFTs elevated (AST > ALT).  Blood alcohol neg.  uA (-) nit/LE.  CXR LLL pna.   Pt given dose of vanco/zosyn.  Pt states she was recently treated for UTI, and was having difficulty urinating, requiring her strain.  Pt states she has chronic cough from smoking, has not noticed any worsening, no sputum production, cp, sob, or pleurisy.  Pt was recently visiting her granddaughter in [place], during which time she took her granddaughter to ER for high fevers.      ID consult called for further abx managment.       REVIEW OF SYSTEMS:    CONSTITUTIONAL: No fever, weight loss, or fatigue  EYES: No eye pain, visual disturbances, or discharge  ENMT:  No sore throat  NECK: No pain or stiffness  RESPIRATORY: No cough, wheezing, chills or hemoptysis; No shortness of breath  CARDIOVASCULAR: No chest pain, palpitations, dizziness, or leg swelling  GASTROINTESTINAL: No abdominal or epigastric pain. No nausea, vomiting, or hematemesis; No diarrhea or constipation. No melena or hematochezia.  GENITOURINARY: No dysuria, frequency, hematuria, or incontinence  NEUROLOGICAL: No headaches, memory loss, loss of strength, numbness, or tremors  SKIN: No itching, burning, rashes, or lesions   LYMPH NODES: No enlarged glands  MUSCULOSKELETAL: No joint pain or swelling; No muscle, back, or extremity pain      PAST MEDICAL & SURGICAL HISTORY:  Hypertension  H/O tubal ligation      Allergies    penicillin (Swelling)    Intolerances        FAMILY HISTORY:  No pertinent family history in first degree relatives      SOCIAL HISTORY:  h/o smoking, etoh abuse.  Denies ivdu.  States PPD (-).        MEDICATIONS  (STANDING):  amLODIPine   Tablet 5 milliGRAM(s) Oral daily  folic acid 1 milliGRAM(s) Oral daily  heparin  Injectable 5000 Unit(s) SubCutaneous every 8 hours  losartan 100 milliGRAM(s) Oral daily  nicotine - 21 mG/24Hr(s) Patch 1 patch Transdermal daily  pantoprazole    Tablet 40 milliGRAM(s) Oral before breakfast  sodium chloride 0.9%. 1000 milliLiter(s) (100 mL/Hr) IV Continuous <Continuous>  thiamine 100 milliGRAM(s) Oral daily    MEDICATIONS  (PRN):      Vital Signs Last 24 Hrs  T(C): 37 (29 Aug 2019 10:03), Max: 39 (28 Aug 2019 21:19)  T(F): 98.6 (29 Aug 2019 10:03), Max: 102.2 (28 Aug 2019 21:19)  HR: 95 (29 Aug 2019 10:03) (79 - 111)  BP: 124/74 (29 Aug 2019 10:03) (92/51 - 124/74)  BP(mean): --  RR: 18 (29 Aug 2019 10:03) (18 - 22)  SpO2: 97% (29 Aug 2019 10:03) (93% - 97%)    PHYSICAL EXAM:    GENERAL: NAD, well-groomed  HEAD:  Atraumatic, Normocephalic  EYES: EOMI, PERRLA, conjunctiva and sclera clear  ENMT: No tonsillar erythema, exudates, or enlargement; Moist mucous membranes  NECK: Supple, No JVD  CHEST/LUNG: Clear to percussion bilaterally; No rales, rhonchi, wheezing, or rubs  HEART: Regular rate and rhythm; No murmurs, rubs, or gallops  ABDOMEN: Soft, Nontender, Nondistended; Bowel sounds present  EXTREMITIES:  2+ Peripheral Pulses, No clubbing, cyanosis, or edema  LYMPH: No lymphadenopathy noted  SKIN: No rashes or lesions    LABS:  CBC Full  -  ( 29 Aug 2019 09:24 )  WBC Count : 10.94 K/uL  RBC Count : 3.63 M/uL  Hemoglobin : 10.6 g/dL  Hematocrit : 31.4 %  Platelet Count - Automated : 238 K/uL  Mean Cell Volume : 86.5 fl  Mean Cell Hemoglobin : 29.2 pg  Mean Cell Hemoglobin Concentration : 33.8 gm/dL  Auto Neutrophil # : x  Auto Lymphocyte # : x  Auto Monocyte # : x  Auto Eosinophil # : x  Auto Basophil # : x  Auto Neutrophil % : x  Auto Lymphocyte % : x  Auto Monocyte % : x  Auto Eosinophil % : x  Auto Basophil % : x          137  |  104  |  8   ----------------------------<  93  3.5   |  20<L>  |  0.68    Ca    9.3      29 Aug 2019 06:21  Mg     1.9         TPro  7.2  /  Alb  3.4  /  TBili  0.4  /  DBili  0.1  /  AST  136<H>  /  ALT  82<H>  /  AlkPhos  81        LIVER FUNCTIONS - ( 29 Aug 2019 06:21 )  Alb: 3.4 g/dL / Pro: 7.2 g/dL / ALK PHOS: 81 U/L / ALT: 82 U/L / AST: 136 U/L / GGT: x                               MICROBIOLOGY:        Urinalysis Basic - ( 29 Aug 2019 06:21 )    Color: Light Yellow / Appearance: Clear / S.007 / pH: x  Gluc: x / Ketone: Trace  / Bili: Negative / Urobili: Negative   Blood: x / Protein: Trace / Nitrite: Negative   Leuk Esterase: Negative / RBC: 8 /hpf / WBC 1 /HPF   Sq Epi: x / Non Sq Epi: 1 /hpf / Bacteria: Negative      Culture - Blood (19 @ 00:50)    Culture - Blood:   NO ORGANISMS ISOLATED  NO ORGANISMS ISOLATED AT 48 HRS.    Specimen Source: BLOOD VENOUS    Culture - Blood (19 @ 00:50)    Culture - Blood:   NO ORGANISMS ISOLATED  NO ORGANISMS ISOLATED AT 48 HRS.    Specimen Source: BLOOD PERIPHERAL    Culture - Urine (19 @ 00:46)    Culture - Urine:   NO GROWTH AT 24 HOURS    Specimen Source: URINE MIDSTREAM    Urinalysis + Microscopic Examination (19 @ 06:21)    Urine Appearance: Clear    Urobilinogen: Negative    Specific Gravity: 1.007    Protein, Urine: Trace    pH Urine: 6.0    Leukocyte Esterase Concentration: Negative    Nitrite: Negative    Ketone - Urine: Trace    Bilirubin: Negative    Color: Light Yellow    Glucose Qualitative, Urine: Negative    Blood, Urine: Moderate    Red Blood Cell - Urine: 8 /hpf    White Blood Cell - Urine: 1 /HPF    Epithelial Cells: 1 /hpf    Hyaline Casts: 1 /lpf    Bacteria: Negative    Rapid Respiratory Viral Panel (19 @ 06:21)    Rapid RVP Result: NotDete: This Respiratory Panel uses polymerase chain reaction (PCR) to detect for  adenovirus; coronavirus (HKU1, NL63, 229E, OC43); human metapneumovirus  (hMPV); human enterovirus/rhinovirus (Entero/RV); influenza A; influenza  A/H1; influenza A/H3; influenza A/H1-2009; influenza B; parainfluenza  viruses 1, 2, 3, 4; respiratory syncytial virus; Mycoplasma pneumoniae;  and Chlamydophila pneumoniae.              RADIOLOGY:      < from: Xray Chest 2 Views PA/Lat (19 @ 18:38) >  FINDINGS:    Patchy consolidation left lower lobe.  There is no evidence of pleural effusion or pneumothorax.  The cardiomediastinal silhouette cannot be accurately assessed in this   projection.  The visualized osseous and soft tissue structures demonstrate no acute   pathology.    IMPRESSION:   Left lower lobe pneumonia.    < end of copied text >        < from: CT Head No Cont (19 @ 17:15) >  FINDINGS:     There is mild enlargement ventricles and sulci consistent with minimal   volume loss. There are nonspecific areas of decreased aeration white   matter, likely sequela microvascular disease. There is no  acute intra or   extra axial hemorrhage or midline shift. Cerebellum is decreased in size   with prominent CSF spaces, there are small mammillary bodies, correlate   with EtOH usage and thiamine vitamin B-1 levels. Partially empty sella.   The basal cisterns are patent. Orbits are unremarkable. Minimal paranasal   sinus mucosal thickening,, mastoid air cells appear free disease.     There is a 1 x 2.2 x 1.9 cm, AP, TR, CC calcific foci extending posterior   from the right midline occipital bone, (4:11), likely osteoma, and 7.2 x   4.7 x 5.7 mm, AP, TR, CC, increased bony density at the right petrous   apex, suggesting multiple osteomas, multiple osteomas can be associated   with Arias syndrome.       IMPRESSION:     No interval change from prior, mild volume loss and microvascular   disease, small cerebellum with prominent CSF spaces and decreased   mammillary body size, correlate with EtOH use and thiamine vitamin B1   levels.    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 No new   hemorrhage or midline shift. Basal cisterns remain patent. If symptoms   persist, consider follow-up head CT or MR if no contraindications.      < end of copied text >

## 2019-08-29 NOTE — PROVIDER CONTACT NOTE (OTHER) - BACKGROUND
pt admitted with shakiness / dizzy. pt has a dx of sepsis. pmhx of htn pt admitted with shakiness / dizzy. pt has a dx of sepsis. pmhx of htn. states last etoh use was on 08/20/2019 - a full bottle of wine

## 2019-08-29 NOTE — PROGRESS NOTE ADULT - PROBLEM SELECTOR PLAN 1
Pneumonia seen on CXR   CT abd/Pelv and chest pending   trend fever  Vanc and zosyn and azithro   pan cx   Echocardiogram, no Hx of Drug use

## 2019-08-30 LAB
ALBUMIN SERPL ELPH-MCNC: 2.9 G/DL — LOW (ref 3.3–5)
ALP SERPL-CCNC: 76 U/L — SIGNIFICANT CHANGE UP (ref 40–120)
ALT FLD-CCNC: 85 U/L — HIGH (ref 10–45)
ANION GAP SERPL CALC-SCNC: 14 MMOL/L — SIGNIFICANT CHANGE UP (ref 5–17)
AST SERPL-CCNC: 109 U/L — HIGH (ref 10–40)
BILIRUB SERPL-MCNC: 0.3 MG/DL — SIGNIFICANT CHANGE UP (ref 0.2–1.2)
BUN SERPL-MCNC: 4 MG/DL — LOW (ref 7–23)
CALCIUM SERPL-MCNC: 9.5 MG/DL — SIGNIFICANT CHANGE UP (ref 8.4–10.5)
CHLORIDE SERPL-SCNC: 102 MMOL/L — SIGNIFICANT CHANGE UP (ref 96–108)
CO2 SERPL-SCNC: 21 MMOL/L — LOW (ref 22–31)
CREAT SERPL-MCNC: 0.68 MG/DL — SIGNIFICANT CHANGE UP (ref 0.5–1.3)
GLUCOSE SERPL-MCNC: 106 MG/DL — HIGH (ref 70–99)
HCT VFR BLD CALC: 27.6 % — LOW (ref 34.5–45)
HGB BLD-MCNC: 9.5 G/DL — LOW (ref 11.5–15.5)
LEGIONELLA AG UR QL: NEGATIVE — SIGNIFICANT CHANGE UP
MCHC RBC-ENTMCNC: 29.7 PG — SIGNIFICANT CHANGE UP (ref 27–34)
MCHC RBC-ENTMCNC: 34.4 GM/DL — SIGNIFICANT CHANGE UP (ref 32–36)
MCV RBC AUTO: 86.3 FL — SIGNIFICANT CHANGE UP (ref 80–100)
MRSA PCR RESULT.: SIGNIFICANT CHANGE UP
PLATELET # BLD AUTO: 251 K/UL — SIGNIFICANT CHANGE UP (ref 150–400)
POTASSIUM SERPL-MCNC: 3.6 MMOL/L — SIGNIFICANT CHANGE UP (ref 3.5–5.3)
POTASSIUM SERPL-SCNC: 3.6 MMOL/L — SIGNIFICANT CHANGE UP (ref 3.5–5.3)
PROT SERPL-MCNC: 6.7 G/DL — SIGNIFICANT CHANGE UP (ref 6–8.3)
RBC # BLD: 3.2 M/UL — LOW (ref 3.8–5.2)
RBC # FLD: 15.4 % — HIGH (ref 10.3–14.5)
S AUREUS DNA NOSE QL NAA+PROBE: SIGNIFICANT CHANGE UP
SODIUM SERPL-SCNC: 137 MMOL/L — SIGNIFICANT CHANGE UP (ref 135–145)
WBC # BLD: 10.72 K/UL — HIGH (ref 3.8–10.5)
WBC # FLD AUTO: 10.72 K/UL — HIGH (ref 3.8–10.5)

## 2019-08-30 RX ORDER — SODIUM CHLORIDE 9 MG/ML
1000 INJECTION INTRAMUSCULAR; INTRAVENOUS; SUBCUTANEOUS
Refills: 0 | Status: DISCONTINUED | OUTPATIENT
Start: 2019-08-30 | End: 2019-08-31

## 2019-08-30 RX ORDER — CEFEPIME 1 G/1
1000 INJECTION, POWDER, FOR SOLUTION INTRAMUSCULAR; INTRAVENOUS EVERY 8 HOURS
Refills: 0 | Status: DISCONTINUED | OUTPATIENT
Start: 2019-08-30 | End: 2019-09-03

## 2019-08-30 RX ADMIN — SODIUM CHLORIDE 75 MILLILITER(S): 9 INJECTION INTRAMUSCULAR; INTRAVENOUS; SUBCUTANEOUS at 17:18

## 2019-08-30 RX ADMIN — AMLODIPINE BESYLATE 5 MILLIGRAM(S): 2.5 TABLET ORAL at 05:49

## 2019-08-30 RX ADMIN — Medication 1 PATCH: at 19:09

## 2019-08-30 RX ADMIN — CEFEPIME 100 MILLIGRAM(S): 1 INJECTION, POWDER, FOR SOLUTION INTRAMUSCULAR; INTRAVENOUS at 22:13

## 2019-08-30 RX ADMIN — SENNA PLUS 1 TABLET(S): 8.6 TABLET ORAL at 22:14

## 2019-08-30 RX ADMIN — Medication 100 MILLIGRAM(S): at 11:58

## 2019-08-30 RX ADMIN — HEPARIN SODIUM 5000 UNIT(S): 5000 INJECTION INTRAVENOUS; SUBCUTANEOUS at 06:00

## 2019-08-30 RX ADMIN — Medication 250 MILLIGRAM(S): at 10:42

## 2019-08-30 RX ADMIN — PIPERACILLIN AND TAZOBACTAM 25 GRAM(S): 4; .5 INJECTION, POWDER, LYOPHILIZED, FOR SOLUTION INTRAVENOUS at 01:47

## 2019-08-30 RX ADMIN — PANTOPRAZOLE SODIUM 40 MILLIGRAM(S): 20 TABLET, DELAYED RELEASE ORAL at 05:49

## 2019-08-30 RX ADMIN — Medication 1 PATCH: at 11:58

## 2019-08-30 RX ADMIN — HEPARIN SODIUM 5000 UNIT(S): 5000 INJECTION INTRAVENOUS; SUBCUTANEOUS at 22:14

## 2019-08-30 RX ADMIN — Medication 1 MILLIGRAM(S): at 11:58

## 2019-08-30 RX ADMIN — LOSARTAN POTASSIUM 100 MILLIGRAM(S): 100 TABLET, FILM COATED ORAL at 05:49

## 2019-08-30 RX ADMIN — HEPARIN SODIUM 5000 UNIT(S): 5000 INJECTION INTRAVENOUS; SUBCUTANEOUS at 13:39

## 2019-08-30 RX ADMIN — PIPERACILLIN AND TAZOBACTAM 25 GRAM(S): 4; .5 INJECTION, POWDER, LYOPHILIZED, FOR SOLUTION INTRAVENOUS at 12:20

## 2019-08-30 RX ADMIN — Medication 1 PATCH: at 06:00

## 2019-08-30 NOTE — PROGRESS NOTE ADULT - PROBLEM SELECTOR PLAN 1
Pneumonia seen on CXR   LLL PNA on CT   CT abd/Pelv and chest noted   trend fever  zosyn and azithro   D/C ed vanco  pan cx   Echocardiogram, no Hx of Drug use  incentive spintometer

## 2019-08-31 LAB
ALBUMIN SERPL ELPH-MCNC: 3.4 G/DL — SIGNIFICANT CHANGE UP (ref 3.3–5)
ALP SERPL-CCNC: 76 U/L — SIGNIFICANT CHANGE UP (ref 40–120)
ALT FLD-CCNC: 74 U/L — HIGH (ref 10–45)
ANION GAP SERPL CALC-SCNC: 14 MMOL/L — SIGNIFICANT CHANGE UP (ref 5–17)
AST SERPL-CCNC: 60 U/L — HIGH (ref 10–40)
BILIRUB SERPL-MCNC: 0.3 MG/DL — SIGNIFICANT CHANGE UP (ref 0.2–1.2)
BUN SERPL-MCNC: 7 MG/DL — SIGNIFICANT CHANGE UP (ref 7–23)
CALCIUM SERPL-MCNC: 9.9 MG/DL — SIGNIFICANT CHANGE UP (ref 8.4–10.5)
CHLORIDE SERPL-SCNC: 105 MMOL/L — SIGNIFICANT CHANGE UP (ref 96–108)
CO2 SERPL-SCNC: 22 MMOL/L — SIGNIFICANT CHANGE UP (ref 22–31)
CREAT SERPL-MCNC: 0.96 MG/DL — SIGNIFICANT CHANGE UP (ref 0.5–1.3)
EBV EA AB SER IA-ACNC: 42 U/ML — HIGH
EBV EA AB TITR SER IF: POSITIVE
EBV EA IGG SER-ACNC: POSITIVE
EBV NA IGG SER IA-ACNC: >600 U/ML — HIGH
EBV PATRN SPEC IB-IMP: SIGNIFICANT CHANGE UP
EBV VCA IGG AVIDITY SER QL IA: POSITIVE
EBV VCA IGM SER IA-ACNC: <10 U/ML — SIGNIFICANT CHANGE UP
EBV VCA IGM SER IA-ACNC: >750 U/ML — HIGH
EBV VCA IGM TITR FLD: NEGATIVE — SIGNIFICANT CHANGE UP
GLUCOSE SERPL-MCNC: 103 MG/DL — HIGH (ref 70–99)
HCT VFR BLD CALC: 30.5 % — LOW (ref 34.5–45)
HGB BLD-MCNC: 10.4 G/DL — LOW (ref 11.5–15.5)
MCHC RBC-ENTMCNC: 28.8 PG — SIGNIFICANT CHANGE UP (ref 27–34)
MCHC RBC-ENTMCNC: 34.1 GM/DL — SIGNIFICANT CHANGE UP (ref 32–36)
MCV RBC AUTO: 84.5 FL — SIGNIFICANT CHANGE UP (ref 80–100)
PLATELET # BLD AUTO: 330 K/UL — SIGNIFICANT CHANGE UP (ref 150–400)
POTASSIUM SERPL-MCNC: 4.2 MMOL/L — SIGNIFICANT CHANGE UP (ref 3.5–5.3)
POTASSIUM SERPL-SCNC: 4.2 MMOL/L — SIGNIFICANT CHANGE UP (ref 3.5–5.3)
PROT SERPL-MCNC: 7.2 G/DL — SIGNIFICANT CHANGE UP (ref 6–8.3)
RBC # BLD: 3.61 M/UL — LOW (ref 3.8–5.2)
RBC # FLD: 15.4 % — HIGH (ref 10.3–14.5)
SODIUM SERPL-SCNC: 141 MMOL/L — SIGNIFICANT CHANGE UP (ref 135–145)
WBC # BLD: 9.41 K/UL — SIGNIFICANT CHANGE UP (ref 3.8–10.5)
WBC # FLD AUTO: 9.41 K/UL — SIGNIFICANT CHANGE UP (ref 3.8–10.5)

## 2019-08-31 RX ADMIN — PANTOPRAZOLE SODIUM 40 MILLIGRAM(S): 20 TABLET, DELAYED RELEASE ORAL at 07:13

## 2019-08-31 RX ADMIN — CEFEPIME 100 MILLIGRAM(S): 1 INJECTION, POWDER, FOR SOLUTION INTRAMUSCULAR; INTRAVENOUS at 13:56

## 2019-08-31 RX ADMIN — AMLODIPINE BESYLATE 5 MILLIGRAM(S): 2.5 TABLET ORAL at 05:52

## 2019-08-31 RX ADMIN — LOSARTAN POTASSIUM 100 MILLIGRAM(S): 100 TABLET, FILM COATED ORAL at 05:52

## 2019-08-31 RX ADMIN — Medication 1 PATCH: at 20:00

## 2019-08-31 RX ADMIN — Medication 1 PATCH: at 07:13

## 2019-08-31 RX ADMIN — CEFEPIME 100 MILLIGRAM(S): 1 INJECTION, POWDER, FOR SOLUTION INTRAMUSCULAR; INTRAVENOUS at 05:52

## 2019-08-31 RX ADMIN — Medication 1 PATCH: at 11:26

## 2019-08-31 RX ADMIN — Medication 1 PATCH: at 11:28

## 2019-08-31 RX ADMIN — HEPARIN SODIUM 5000 UNIT(S): 5000 INJECTION INTRAVENOUS; SUBCUTANEOUS at 05:52

## 2019-08-31 RX ADMIN — SENNA PLUS 1 TABLET(S): 8.6 TABLET ORAL at 21:21

## 2019-08-31 RX ADMIN — HEPARIN SODIUM 5000 UNIT(S): 5000 INJECTION INTRAVENOUS; SUBCUTANEOUS at 13:57

## 2019-08-31 RX ADMIN — Medication 100 MILLIGRAM(S): at 11:29

## 2019-08-31 RX ADMIN — Medication 1 MILLIGRAM(S): at 11:29

## 2019-08-31 RX ADMIN — HEPARIN SODIUM 5000 UNIT(S): 5000 INJECTION INTRAVENOUS; SUBCUTANEOUS at 21:22

## 2019-08-31 RX ADMIN — CEFEPIME 100 MILLIGRAM(S): 1 INJECTION, POWDER, FOR SOLUTION INTRAMUSCULAR; INTRAVENOUS at 21:21

## 2019-08-31 NOTE — PROGRESS NOTE ADULT - PROBLEM SELECTOR PLAN 1
Pneumonia seen on CXR   LLL PNA on CT   CT abd/Pelv and chest noted   trend fever  Can D/C azithro  zosyn changed to cefepime   pan cx   Echocardiogram, no Hx of Drug use  incentive spintometer

## 2019-09-01 LAB
ALBUMIN SERPL ELPH-MCNC: 3.4 G/DL — SIGNIFICANT CHANGE UP (ref 3.3–5)
ALP SERPL-CCNC: 76 U/L — SIGNIFICANT CHANGE UP (ref 40–120)
ALT FLD-CCNC: 57 U/L — HIGH (ref 10–45)
ANION GAP SERPL CALC-SCNC: 14 MMOL/L — SIGNIFICANT CHANGE UP (ref 5–17)
AST SERPL-CCNC: 36 U/L — SIGNIFICANT CHANGE UP (ref 10–40)
BACTERIA BLD CULT: SIGNIFICANT CHANGE UP
BACTERIA BLD CULT: SIGNIFICANT CHANGE UP
BILIRUB SERPL-MCNC: 0.2 MG/DL — SIGNIFICANT CHANGE UP (ref 0.2–1.2)
BUN SERPL-MCNC: 9 MG/DL — SIGNIFICANT CHANGE UP (ref 7–23)
CALCIUM SERPL-MCNC: 10 MG/DL — SIGNIFICANT CHANGE UP (ref 8.4–10.5)
CHLORIDE SERPL-SCNC: 106 MMOL/L — SIGNIFICANT CHANGE UP (ref 96–108)
CO2 SERPL-SCNC: 21 MMOL/L — LOW (ref 22–31)
CREAT SERPL-MCNC: 0.87 MG/DL — SIGNIFICANT CHANGE UP (ref 0.5–1.3)
GLUCOSE SERPL-MCNC: 101 MG/DL — HIGH (ref 70–99)
HCT VFR BLD CALC: 32.1 % — LOW (ref 34.5–45)
HGB BLD-MCNC: 10.9 G/DL — LOW (ref 11.5–15.5)
MCHC RBC-ENTMCNC: 29.2 PG — SIGNIFICANT CHANGE UP (ref 27–34)
MCHC RBC-ENTMCNC: 34 GM/DL — SIGNIFICANT CHANGE UP (ref 32–36)
MCV RBC AUTO: 86.1 FL — SIGNIFICANT CHANGE UP (ref 80–100)
PLATELET # BLD AUTO: 451 K/UL — HIGH (ref 150–400)
POTASSIUM SERPL-MCNC: 3.9 MMOL/L — SIGNIFICANT CHANGE UP (ref 3.5–5.3)
POTASSIUM SERPL-SCNC: 3.9 MMOL/L — SIGNIFICANT CHANGE UP (ref 3.5–5.3)
PROT SERPL-MCNC: 7.2 G/DL — SIGNIFICANT CHANGE UP (ref 6–8.3)
RBC # BLD: 3.73 M/UL — LOW (ref 3.8–5.2)
RBC # FLD: 15.2 % — HIGH (ref 10.3–14.5)
SODIUM SERPL-SCNC: 141 MMOL/L — SIGNIFICANT CHANGE UP (ref 135–145)
WBC # BLD: 9.06 K/UL — SIGNIFICANT CHANGE UP (ref 3.8–10.5)
WBC # FLD AUTO: 9.06 K/UL — SIGNIFICANT CHANGE UP (ref 3.8–10.5)

## 2019-09-01 RX ORDER — DOCUSATE SODIUM 100 MG
100 CAPSULE ORAL
Refills: 0 | Status: DISCONTINUED | OUTPATIENT
Start: 2019-09-01 | End: 2019-09-03

## 2019-09-01 RX ADMIN — Medication 100 MILLIGRAM(S): at 11:29

## 2019-09-01 RX ADMIN — AMLODIPINE BESYLATE 5 MILLIGRAM(S): 2.5 TABLET ORAL at 05:16

## 2019-09-01 RX ADMIN — PANTOPRAZOLE SODIUM 40 MILLIGRAM(S): 20 TABLET, DELAYED RELEASE ORAL at 05:16

## 2019-09-01 RX ADMIN — Medication 100 MILLIGRAM(S): at 21:45

## 2019-09-01 RX ADMIN — HEPARIN SODIUM 5000 UNIT(S): 5000 INJECTION INTRAVENOUS; SUBCUTANEOUS at 21:45

## 2019-09-01 RX ADMIN — HEPARIN SODIUM 5000 UNIT(S): 5000 INJECTION INTRAVENOUS; SUBCUTANEOUS at 15:10

## 2019-09-01 RX ADMIN — SENNA PLUS 1 TABLET(S): 8.6 TABLET ORAL at 21:45

## 2019-09-01 RX ADMIN — Medication 1 PATCH: at 11:29

## 2019-09-01 RX ADMIN — Medication 1 PATCH: at 08:46

## 2019-09-01 RX ADMIN — CEFEPIME 100 MILLIGRAM(S): 1 INJECTION, POWDER, FOR SOLUTION INTRAMUSCULAR; INTRAVENOUS at 21:45

## 2019-09-01 RX ADMIN — Medication 1 PATCH: at 19:00

## 2019-09-01 RX ADMIN — CEFEPIME 100 MILLIGRAM(S): 1 INJECTION, POWDER, FOR SOLUTION INTRAMUSCULAR; INTRAVENOUS at 15:08

## 2019-09-01 RX ADMIN — CEFEPIME 100 MILLIGRAM(S): 1 INJECTION, POWDER, FOR SOLUTION INTRAMUSCULAR; INTRAVENOUS at 08:47

## 2019-09-01 RX ADMIN — Medication 1 MILLIGRAM(S): at 11:29

## 2019-09-01 RX ADMIN — Medication 1 PATCH: at 11:28

## 2019-09-01 RX ADMIN — LOSARTAN POTASSIUM 100 MILLIGRAM(S): 100 TABLET, FILM COATED ORAL at 05:16

## 2019-09-01 RX ADMIN — HEPARIN SODIUM 5000 UNIT(S): 5000 INJECTION INTRAVENOUS; SUBCUTANEOUS at 05:16

## 2019-09-01 NOTE — PROGRESS NOTE ADULT - PROBLEM SELECTOR PLAN 1
Pneumonia seen on CXR   LLL PNA on CT   CT abd/Pelv and chest noted   trend fever  Can D/C azithro and vanco   Cont cefepime   pan cx   Echocardiogram, no Hx of Drug use  incentive spintometer Pneumonia seen on CXR   LLL PNA on CT   CT abd/Pelv and chest noted   trend fever  Can D/C azithro and vanco   Cont cefepime   pan cx   Echocardiogram, no Hx of Drug use  incentive spintometer  D/C planing for tomorrow

## 2019-09-02 ENCOUNTER — TRANSCRIPTION ENCOUNTER (OUTPATIENT)
Age: 59
End: 2019-09-02

## 2019-09-02 LAB
ALBUMIN SERPL ELPH-MCNC: 3.6 G/DL — SIGNIFICANT CHANGE UP (ref 3.3–5)
ALP SERPL-CCNC: 75 U/L — SIGNIFICANT CHANGE UP (ref 40–120)
ALT FLD-CCNC: 47 U/L — HIGH (ref 10–45)
ANION GAP SERPL CALC-SCNC: 12 MMOL/L — SIGNIFICANT CHANGE UP (ref 5–17)
AST SERPL-CCNC: 29 U/L — SIGNIFICANT CHANGE UP (ref 10–40)
BILIRUB SERPL-MCNC: 0.2 MG/DL — SIGNIFICANT CHANGE UP (ref 0.2–1.2)
BUN SERPL-MCNC: 12 MG/DL — SIGNIFICANT CHANGE UP (ref 7–23)
CALCIUM SERPL-MCNC: 9.8 MG/DL — SIGNIFICANT CHANGE UP (ref 8.4–10.5)
CHLORIDE SERPL-SCNC: 103 MMOL/L — SIGNIFICANT CHANGE UP (ref 96–108)
CO2 SERPL-SCNC: 24 MMOL/L — SIGNIFICANT CHANGE UP (ref 22–31)
CREAT SERPL-MCNC: 0.9 MG/DL — SIGNIFICANT CHANGE UP (ref 0.5–1.3)
CULTURE RESULTS: SIGNIFICANT CHANGE UP
CULTURE RESULTS: SIGNIFICANT CHANGE UP
GLUCOSE SERPL-MCNC: 100 MG/DL — HIGH (ref 70–99)
POTASSIUM SERPL-MCNC: 4.2 MMOL/L — SIGNIFICANT CHANGE UP (ref 3.5–5.3)
POTASSIUM SERPL-SCNC: 4.2 MMOL/L — SIGNIFICANT CHANGE UP (ref 3.5–5.3)
PROT SERPL-MCNC: 7.1 G/DL — SIGNIFICANT CHANGE UP (ref 6–8.3)
SODIUM SERPL-SCNC: 139 MMOL/L — SIGNIFICANT CHANGE UP (ref 135–145)
SPECIMEN SOURCE: SIGNIFICANT CHANGE UP
SPECIMEN SOURCE: SIGNIFICANT CHANGE UP

## 2019-09-02 RX ORDER — THIAMINE MONONITRATE (VIT B1) 100 MG
1 TABLET ORAL
Qty: 0 | Refills: 0 | DISCHARGE
Start: 2019-09-02

## 2019-09-02 RX ORDER — FOLIC ACID 0.8 MG
1 TABLET ORAL
Qty: 0 | Refills: 0 | DISCHARGE
Start: 2019-09-02

## 2019-09-02 RX ORDER — SENNA PLUS 8.6 MG/1
1 TABLET ORAL
Qty: 0 | Refills: 0 | DISCHARGE
Start: 2019-09-02

## 2019-09-02 RX ORDER — PANTOPRAZOLE SODIUM 20 MG/1
1 TABLET, DELAYED RELEASE ORAL
Qty: 0 | Refills: 0 | DISCHARGE
Start: 2019-09-02

## 2019-09-02 RX ORDER — ACETAMINOPHEN 500 MG
0 TABLET ORAL
Qty: 0 | Refills: 0 | DISCHARGE

## 2019-09-02 RX ORDER — IBUPROFEN 200 MG
0 TABLET ORAL
Qty: 0 | Refills: 0 | DISCHARGE

## 2019-09-02 RX ORDER — DOCUSATE SODIUM 100 MG
1 CAPSULE ORAL
Qty: 0 | Refills: 0 | DISCHARGE
Start: 2019-09-02

## 2019-09-02 RX ADMIN — HEPARIN SODIUM 5000 UNIT(S): 5000 INJECTION INTRAVENOUS; SUBCUTANEOUS at 05:21

## 2019-09-02 RX ADMIN — Medication 1 PATCH: at 13:46

## 2019-09-02 RX ADMIN — LOSARTAN POTASSIUM 100 MILLIGRAM(S): 100 TABLET, FILM COATED ORAL at 05:21

## 2019-09-02 RX ADMIN — CEFEPIME 100 MILLIGRAM(S): 1 INJECTION, POWDER, FOR SOLUTION INTRAMUSCULAR; INTRAVENOUS at 05:21

## 2019-09-02 RX ADMIN — HEPARIN SODIUM 5000 UNIT(S): 5000 INJECTION INTRAVENOUS; SUBCUTANEOUS at 13:51

## 2019-09-02 RX ADMIN — AMLODIPINE BESYLATE 5 MILLIGRAM(S): 2.5 TABLET ORAL at 13:46

## 2019-09-02 RX ADMIN — Medication 100 MILLIGRAM(S): at 21:28

## 2019-09-02 RX ADMIN — Medication 100 MILLIGRAM(S): at 13:46

## 2019-09-02 RX ADMIN — SENNA PLUS 1 TABLET(S): 8.6 TABLET ORAL at 21:28

## 2019-09-02 RX ADMIN — HEPARIN SODIUM 5000 UNIT(S): 5000 INJECTION INTRAVENOUS; SUBCUTANEOUS at 21:28

## 2019-09-02 RX ADMIN — PANTOPRAZOLE SODIUM 40 MILLIGRAM(S): 20 TABLET, DELAYED RELEASE ORAL at 05:21

## 2019-09-02 RX ADMIN — CEFEPIME 100 MILLIGRAM(S): 1 INJECTION, POWDER, FOR SOLUTION INTRAMUSCULAR; INTRAVENOUS at 13:51

## 2019-09-02 RX ADMIN — CEFEPIME 100 MILLIGRAM(S): 1 INJECTION, POWDER, FOR SOLUTION INTRAMUSCULAR; INTRAVENOUS at 21:28

## 2019-09-02 RX ADMIN — Medication 1 PATCH: at 13:45

## 2019-09-02 RX ADMIN — Medication 1 MILLIGRAM(S): at 13:46

## 2019-09-02 RX ADMIN — Medication 1 PATCH: at 07:32

## 2019-09-02 RX ADMIN — Medication 1 PATCH: at 19:20

## 2019-09-02 NOTE — PROGRESS NOTE ADULT - PROBLEM SELECTOR PLAN 1
Pneumonia seen on CXR   LLL PNA on CT   CT abd/Pelv and chest noted   trend fever  Can D/C azithro and vanco   Cont cefepime   pan cx   Echocardiogram, no Hx of Drug use  incentive spintometer  D/C on levaquin tomorrow

## 2019-09-02 NOTE — DISCHARGE NOTE PROVIDER - HOSPITAL COURSE
Pt is a 60 y/o Female with pmhx of HTN, DM presents to ER c/o of dizziness, "shaking" and fevers x1week.             Fever.  Plan: Pneumonia seen on CXR     LLL PNA on CT     CT abd/Pelv and chest noted     trend fever    Can D/C azithro and vanco     Cont cefepime     switch to levaquin on d/c     pan cx     Echocardiogram, no Hx of Drug use        ETOH abuse.  Plan: CIWA protocol PRN, currently no tremors or AMS         Anemia.  Plan: chronic disease     Anemia W/U.          Elevated LFTs.  Plan: secondary to Hx of ETOH    monitor LFts for now        HTN (hypertension). Plan: norvasc 5 and valsartan, can switch to losartan 100     monitor BP and titrate as tolerated. Pt is a 60 y/o Female with pmhx of HTN, DM presents to ER c/o of dizziness, "shaking" and fevers x1week.  "im in alcohol withdrawal".  patient reports was on a 5 day briggs where she drank a bottle of red wine nightly, although admits to drinking a bottle of wine most nights of the week.  The day after began feeling shaky, and having fevers, admit she does feel like that sometimes after drinking but able to control "withdrawal" with Tylenol PM.  Reports symptoms persisted so went to [facility] 2 days ago and was diagnosed with UTi- has been taking keflex for symptoms.  Reports daily fevers - Tmax 103 Reports still feeling dizziness and chills - head swinging sensation.  Denies acute visual changes, numbness/tinglng, falls, head trauma, abdominal pain, n/v. (28 Aug 2019 18:47)        ER vitals:  Tm 102.2, P 103, BP 95/56.  WBC 14.1 --> 10.9.  LFTs elevated (AST > ALT).  Blood alcohol neg.  uA (-) nit/LE.  CXR LLL pna.   Pt given dose of vanco/zosyn.  Pt states she was recently treated for UTI, and was having difficulty urinating, requiring her strain.  Pt states she has chronic cough from smoking, has not noticed any worsening, no sputum production, cp, sob, or pleurisy.  Pt was recently visiting her granddaughter in [place], during which time she took her granddaughter to ER for high fevers.          ID consult called for further abx managment.         Sepsis:        - Pt a/w fever, mild tachycardia, leukocytosis. Suspect source is LLL pna as seen on cxr imaging.  Cont broad spectrum abx to treat infectious focus.        - f/u  blood cx, urine cx, UA - NGTD        - Monitor lactate, check procalcitonin.  Monitor pulse ox and hemodynamic status.  Pt is currently clinically stable.          - f/u diagnostic imaging studies to r/o alternate source of infection - CT c/a/p - also demonstrates LLL patchy opacity            LLL pna:        - Cxr and CT chest shows L sided opacity        - Cont broad spectrum abx.  f/u MRSA nasal pcr. (vanco d/c'd).  Pt states intolerance to zosyn (c/o dizziness) - change to cefepime 1gm IV q8.          - Legionella Ag (-), can d/c azithromycin.                 Transaminitis:        - AST > ALT, possible from ETOH.  f/u ctap.    Trend LFTS - cont to improve        - Check hepatitis panel (Hep C nonreactive), ebv (serology shows prior exposure), cmv                Cleared for d/c home per Dr Adamson. ID said pt may have 1 dose of po Levaquin tonight Pt is a 60 y/o Female with pmhx of HTN, DM presents to ER c/o of dizziness, "shaking" and fevers x1week.  "im in alcohol withdrawal".  patient reports was on a 5 day briggs where she drank a bottle of red wine nightly, although admits to drinking a bottle of wine most nights of the week.  The day after began feeling shaky, and having fevers, admit she does feel like that sometimes after drinking but able to control "withdrawal" with Tylenol PM.  Reports symptoms persisted so went to [facility] 2 days ago and was diagnosed with UTi- has been taking keflex for symptoms.  Reports daily fevers - Tmax 103 Reports still feeling dizziness and chills - head swinging sensation.  Denies acute visual changes, numbness/tinglng, falls, head trauma, abdominal pain, n/v. (28 Aug 2019 18:47)        ER vitals:  Tm 102.2, P 103, BP 95/56.  WBC 14.1 --> 10.9.  LFTs elevated (AST > ALT).  Blood alcohol neg.  uA (-) nit/LE.  CXR LLL pna.   Pt given dose of vanco/zosyn.  Pt states she was recently treated for UTI, and was having difficulty urinating, requiring her strain.  Pt states she has chronic cough from smoking, has not noticed any worsening, no sputum production, cp, sob, or pleurisy.  Pt was recently visiting her granddaughter in [place], during which time she took her granddaughter to ER for high fevers.          ID consult called for further abx managment.         Sepsis:        - Pt a/w fever, mild tachycardia, leukocytosis. Suspect source is LLL pna as seen on cxr imaging.  Cont broad spectrum abx to treat infectious focus.        - f/u  blood cx, urine cx, UA - NGTD        - Monitor lactate, check procalcitonin.  Monitor pulse ox and hemodynamic status.  Pt is currently clinically stable.          - f/u diagnostic imaging studies to r/o alternate source of infection - CT c/a/p - also demonstrates LLL patchy opacity            LLL pna:        - Cxr and CT chest shows L sided opacity        - Cont broad spectrum abx.  f/u MRSA nasal pcr. (vanco d/c'd).  Pt states intolerance to zosyn (c/o dizziness) - change to cefepime 1gm IV q8.         patient changed to Po Levaquin 500 mg oral x 1 more dose to complete 7 days antibiotics        - Legionella Ag (-), discontinued azithromycin.         orthostasis resolved.    patient cleared for discharge home by Dr Daly to follow up with pcp in 1 week and repeat cxr in 1 month                    Transaminitis:        - AST > ALT, possible from ETOH.  f/u ctap.    Trend LFTS - cont to improve        - Check hepatitis panel (Hep C nonreactive), ebv (serology shows prior exposure), cmv                Cleared for d/c home per Dr Adamson. ID said pt may have 1 dose of po Levaquin tonight

## 2019-09-02 NOTE — DISCHARGE NOTE PROVIDER - NSDCCPCAREPLAN_GEN_ALL_CORE_FT
PRINCIPAL DISCHARGE DIAGNOSIS  Diagnosis: LLL pneumonia  Assessment and Plan of Treatment: Pneumonia is a lung infection that can cause a fever, cough, and trouble breathing.  Continue all antibiotics as ordered until complete.  Nutrition is important, eat small frequent meals.  Get lots of rest and drink fluids.  Call your health care provider upon arrival home from hospital and make a follow up appointment for one week.  If your cough worsens, you develop fever greater than 101', you have shaking chills, a fast heartbeat, trouble breathing and/or feel your are breathing much faster than usual, call your healthcare provider.  Make sure you wash your hands frequently.      SECONDARY DISCHARGE DIAGNOSES  Diagnosis: HTN (hypertension)  Assessment and Plan of Treatment: Take medications for your blood pressure as recommended.  Eat a heart healthy diet that is low in saturated fats and salt, and includes whole grains, fruits, vegetables and lean protein   Exercise regularly (consult with your physician or cardiologist first); maintain a heart healthy weight.   If you smoke - quit (A resource to help you stop smoking is the [facility] – phone number 037-097-5538.). Continue to follow with your primary physician or cardiologist.   Seek medical help for dizziness, Lightheadedness, Blurry vision, Headache, Chest pain, Shortness of breath  Follow up with your medical doctor to establish long term blood pressure treatment goals.    Diagnosis: ETOH abuse  Assessment and Plan of Treatment: Do not drink alcohol PRINCIPAL DISCHARGE DIAGNOSIS  Diagnosis: LLL pneumonia  Assessment and Plan of Treatment: Pneumonia is a lung infection that can cause a fever, cough, and trouble breathing.  Continue the last does of Levaquin tonight.   Nutrition is important, eat small frequent meals.  Get lots of rest and drink fluids.  Call your health care provider upon arrival home from hospital and make a follow up appointment for one week.  If your cough worsens, you develop fever greater than 101', you have shaking chills, a fast heartbeat, trouble breathing and/or feel your are breathing much faster than usual, call your healthcare provider.  Make sure you wash your hands frequently.      SECONDARY DISCHARGE DIAGNOSES  Diagnosis: HTN (hypertension)  Assessment and Plan of Treatment: Take medications for your blood pressure as recommended.  Eat a heart healthy diet that is low in saturated fats and salt, and includes whole grains, fruits, vegetables and lean protein   Exercise regularly (consult with your physician or cardiologist first); maintain a heart healthy weight.   If you smoke - quit (A resource to help you stop smoking is the [facility] – phone number 053-572-8948.). Continue to follow with your primary physician or cardiologist.   Seek medical help for dizziness, Lightheadedness, Blurry vision, Headache, Chest pain, Shortness of breath  Follow up with your medical doctor to establish long term blood pressure treatment goals.    Diagnosis: ETOH abuse  Assessment and Plan of Treatment: Do not drink alcohol PRINCIPAL DISCHARGE DIAGNOSIS  Diagnosis: LLL pneumonia  Assessment and Plan of Treatment: Pneumonia is a lung infection that can cause a fever, cough, and trouble breathing.  you took the last does of Levaquin today   Nutrition is important, eat small frequent meals.  Get lots of rest and drink fluids.  Call your health care provider upon arrival home from hospital and make a follow up appointment for one week.  If your cough worsens, you develop fever greater than 101', you have shaking chills, a fast heartbeat, trouble breathing and/or feel your are breathing much faster than usual, call your healthcare provider.  Make sure you wash your hands frequently.  repeat Chest xray in 1 month to make sure of resolution of infection      SECONDARY DISCHARGE DIAGNOSES  Diagnosis: HTN (hypertension)  Assessment and Plan of Treatment: Take medications for your blood pressure as recommended.  Eat a heart healthy diet that is low in saturated fats and salt, and includes whole grains, fruits, vegetables and lean protein   Exercise regularly (consult with your physician or cardiologist first); maintain a heart healthy weight.   If you smoke - quit (A resource to help you stop smoking is the [facility] – phone number 728-722-3746.). Continue to follow with your primary physician or cardiologist.   Seek medical help for dizziness, Lightheadedness, Blurry vision, Headache, Chest pain, Shortness of breath  Follow up with your medical doctor to establish long term blood pressure treatment goals.    Diagnosis: ETOH abuse  Assessment and Plan of Treatment: Do not drink alcohol

## 2019-09-02 NOTE — DISCHARGE NOTE PROVIDER - PROVIDER TOKENS
FREE:[LAST:[Dr Madrigal in [facility]],PHONE:[(   )    -],FAX:[(   )    -]]

## 2019-09-03 ENCOUNTER — TRANSCRIPTION ENCOUNTER (OUTPATIENT)
Age: 59
End: 2019-09-03

## 2019-09-03 VITALS — WEIGHT: 171.96 LBS

## 2019-09-03 DIAGNOSIS — I95.1 ORTHOSTATIC HYPOTENSION: ICD-10-CM

## 2019-09-03 LAB
CMV DNA CSF QL NAA+PROBE: SIGNIFICANT CHANGE UP
CMV DNA SPEC NAA+PROBE-LOG#: SIGNIFICANT CHANGE UP LOGIU/ML

## 2019-09-03 PROCEDURE — 87798 DETECT AGENT NOS DNA AMP: CPT

## 2019-09-03 PROCEDURE — 93005 ELECTROCARDIOGRAM TRACING: CPT

## 2019-09-03 PROCEDURE — 71046 X-RAY EXAM CHEST 2 VIEWS: CPT

## 2019-09-03 PROCEDURE — 87449 NOS EACH ORGANISM AG IA: CPT

## 2019-09-03 PROCEDURE — 74177 CT ABD & PELVIS W/CONTRAST: CPT

## 2019-09-03 PROCEDURE — 85027 COMPLETE CBC AUTOMATED: CPT

## 2019-09-03 PROCEDURE — 81001 URINALYSIS AUTO W/SCOPE: CPT

## 2019-09-03 PROCEDURE — 86664 EPSTEIN-BARR NUCLEAR ANTIGEN: CPT

## 2019-09-03 PROCEDURE — 82607 VITAMIN B-12: CPT

## 2019-09-03 PROCEDURE — 80307 DRUG TEST PRSMV CHEM ANLYZR: CPT

## 2019-09-03 PROCEDURE — 80053 COMPREHEN METABOLIC PANEL: CPT

## 2019-09-03 PROCEDURE — 86663 EPSTEIN-BARR ANTIBODY: CPT

## 2019-09-03 PROCEDURE — 70450 CT HEAD/BRAIN W/O DYE: CPT

## 2019-09-03 PROCEDURE — 87486 CHLMYD PNEUM DNA AMP PROBE: CPT

## 2019-09-03 PROCEDURE — 71260 CT THORAX DX C+: CPT

## 2019-09-03 PROCEDURE — 87640 STAPH A DNA AMP PROBE: CPT

## 2019-09-03 PROCEDURE — 83735 ASSAY OF MAGNESIUM: CPT

## 2019-09-03 PROCEDURE — 87040 BLOOD CULTURE FOR BACTERIA: CPT

## 2019-09-03 PROCEDURE — 87581 M.PNEUMON DNA AMP PROBE: CPT

## 2019-09-03 PROCEDURE — 83540 ASSAY OF IRON: CPT

## 2019-09-03 PROCEDURE — 84132 ASSAY OF SERUM POTASSIUM: CPT

## 2019-09-03 PROCEDURE — 87633 RESP VIRUS 12-25 TARGETS: CPT

## 2019-09-03 PROCEDURE — 82435 ASSAY OF BLOOD CHLORIDE: CPT

## 2019-09-03 PROCEDURE — 83036 HEMOGLOBIN GLYCOSYLATED A1C: CPT

## 2019-09-03 PROCEDURE — 84443 ASSAY THYROID STIM HORMONE: CPT

## 2019-09-03 PROCEDURE — 82803 BLOOD GASES ANY COMBINATION: CPT

## 2019-09-03 PROCEDURE — 84145 PROCALCITONIN (PCT): CPT

## 2019-09-03 PROCEDURE — 82728 ASSAY OF FERRITIN: CPT

## 2019-09-03 PROCEDURE — 82330 ASSAY OF CALCIUM: CPT

## 2019-09-03 PROCEDURE — 99285 EMERGENCY DEPT VISIT HI MDM: CPT

## 2019-09-03 PROCEDURE — 85014 HEMATOCRIT: CPT

## 2019-09-03 PROCEDURE — 86803 HEPATITIS C AB TEST: CPT

## 2019-09-03 PROCEDURE — 86665 EPSTEIN-BARR CAPSID VCA: CPT

## 2019-09-03 PROCEDURE — 82746 ASSAY OF FOLIC ACID SERUM: CPT

## 2019-09-03 PROCEDURE — 83550 IRON BINDING TEST: CPT

## 2019-09-03 PROCEDURE — 84484 ASSAY OF TROPONIN QUANT: CPT

## 2019-09-03 PROCEDURE — 80061 LIPID PANEL: CPT

## 2019-09-03 PROCEDURE — 93306 TTE W/DOPPLER COMPLETE: CPT

## 2019-09-03 PROCEDURE — 82248 BILIRUBIN DIRECT: CPT

## 2019-09-03 PROCEDURE — 87641 MR-STAPH DNA AMP PROBE: CPT

## 2019-09-03 PROCEDURE — 84295 ASSAY OF SERUM SODIUM: CPT

## 2019-09-03 PROCEDURE — 82947 ASSAY GLUCOSE BLOOD QUANT: CPT

## 2019-09-03 PROCEDURE — 83605 ASSAY OF LACTIC ACID: CPT

## 2019-09-03 RX ORDER — SODIUM CHLORIDE 9 MG/ML
500 INJECTION INTRAMUSCULAR; INTRAVENOUS; SUBCUTANEOUS ONCE
Refills: 0 | Status: COMPLETED | OUTPATIENT
Start: 2019-09-03 | End: 2019-09-03

## 2019-09-03 RX ADMIN — HEPARIN SODIUM 5000 UNIT(S): 5000 INJECTION INTRAVENOUS; SUBCUTANEOUS at 06:39

## 2019-09-03 RX ADMIN — Medication 1 PATCH: at 06:51

## 2019-09-03 RX ADMIN — Medication 1 PATCH: at 16:10

## 2019-09-03 RX ADMIN — LOSARTAN POTASSIUM 100 MILLIGRAM(S): 100 TABLET, FILM COATED ORAL at 06:39

## 2019-09-03 RX ADMIN — AMLODIPINE BESYLATE 5 MILLIGRAM(S): 2.5 TABLET ORAL at 10:02

## 2019-09-03 RX ADMIN — PANTOPRAZOLE SODIUM 40 MILLIGRAM(S): 20 TABLET, DELAYED RELEASE ORAL at 06:39

## 2019-09-03 RX ADMIN — Medication 100 MILLIGRAM(S): at 16:19

## 2019-09-03 RX ADMIN — Medication 1 MILLIGRAM(S): at 16:19

## 2019-09-03 RX ADMIN — CEFEPIME 100 MILLIGRAM(S): 1 INJECTION, POWDER, FOR SOLUTION INTRAMUSCULAR; INTRAVENOUS at 06:39

## 2019-09-03 RX ADMIN — Medication 1 PATCH: at 16:19

## 2019-09-03 NOTE — PROGRESS NOTE ADULT - PROBLEM SELECTOR PLAN 4
replace all electrolytes  Nephro eval appreciated
replace all electrolytes  Nephro eval appreciated
chronic disease   Anemia W/U
replace all electrolytes  Nephro eval appreciated

## 2019-09-03 NOTE — PROGRESS NOTE ADULT - PROBLEM SELECTOR PROBLEM 6
HTN (hypertension)
Elevated LFTs
HTN (hypertension)
HTN (hypertension)

## 2019-09-03 NOTE — PROGRESS NOTE ADULT - SUBJECTIVE AND OBJECTIVE BOX
Patient is a 59y old  Female who presents with a chief complaint of fever (02 Sep 2019 13:55)      INTERVAL HISTORY: feels ok    MEDICATIONS:  amLODIPine   Tablet 5 milliGRAM(s) Oral daily  losartan 100 milliGRAM(s) Oral daily        PHYSICAL EXAM:  T(C): 36.8 (09-02-19 @ 17:05), Max: 36.9 (09-01-19 @ 21:41)  HR: 73 (09-02-19 @ 17:05) (70 - 82)  BP: 118/77 (09-02-19 @ 17:05) (103/73 - 118/77)  RR: 18 (09-02-19 @ 17:05) (18 - 18)  SpO2: 96% (09-02-19 @ 17:05) (96% - 99%)  Wt(kg): --  I&O's Summary    01 Sep 2019 07:01  -  02 Sep 2019 07:00  --------------------------------------------------------  IN: 580 mL / OUT: 0 mL / NET: 580 mL          Appearance: In no distress	  HEENT:    PERRL, EOMI	  Cardiovascular:  S1 S2, No JVD  Respiratory: Lungs clear to auscultation	  Gastrointestinal:  Soft, Non-tender, + BS	  Extremities:  No edema of LE                                10.9   9.06  )-----------( 451      ( 01 Sep 2019 09:27 )             32.1     09-02    139  |  103  |  12  ----------------------------<  100<H>  4.2   |  24  |  0.90    Ca    9.8      02 Sep 2019 06:37    TPro  7.1  /  Alb  3.6  /  TBili  0.2  /  DBili  x   /  AST  29  /  ALT  47<H>  /  AlkPhos  75  09-02        Labs personally reviewed      Assessment and Plan:   · Assessment		  Pt is a 60 y/o Female with pmhx of HTN, DM presents to ER c/o of dizziness, "shaking" and fevers x1week.     Problem/Plan - 1:  ·  Problem: Fevers.  Plan: Pneumonia likely   CT abd/Pelv and chest reviewed   TTE to ruled out endocardtis      Problem/Plan - 2:  Problem: HTN (hypertension). Plan: norvasc 5 and valsartan, losartan 100   BP well controlled        Phillip Melara DO [facility]  Cardiovascular Medicine  [phone]
Infectious Diseases progress note:    Subjective:  Pt improving clinically,  Cough improving, afebrile.  SOB is better.  No productive sputum.      ROS:  CONSTITUTIONAL:  No fever, chills, rigors  CARDIOVASCULAR:  No chest pain or palpitations  RESPIRATORY:   No SOB, cough, dyspnea on exertion.  No wheezing  GASTROINTESTINAL:  No abd pain, N/V, diarrhea/constipation  EXTREMITIES:  No swelling or joint pain  GENITOURINARY:  No burning on urination, increased frequency or urgency.  No flank pain  NEUROLOGIC:  No HA, visual disturbances  SKIN: No rashes    Allergies    penicillin (Swelling)    Intolerances        ANTIBIOTICS/RELEVANT:  antimicrobials  cefepime   IVPB 1000 milliGRAM(s) IV Intermittent every 8 hours    immunologic:    OTHER:  amLODIPine   Tablet 5 milliGRAM(s) Oral daily  docusate sodium 100 milliGRAM(s) Oral daily  folic acid 1 milliGRAM(s) Oral daily  heparin  Injectable 5000 Unit(s) SubCutaneous every 8 hours  losartan 100 milliGRAM(s) Oral daily  nicotine - 21 mG/24Hr(s) Patch 1 patch Transdermal daily  pantoprazole    Tablet 40 milliGRAM(s) Oral before breakfast  senna 1 Tablet(s) Oral at bedtime  thiamine 100 milliGRAM(s) Oral daily      Objective:  Vital Signs Last 24 Hrs  T(C): 36.7 (01 Sep 2019 00:13), Max: 36.9 (31 Aug 2019 05:47)  T(F): 98.1 (01 Sep 2019 00:13), Max: 98.5 (31 Aug 2019 05:47)  HR: 80 (01 Sep 2019 00:13) (69 - 86)  BP: 123/79 (01 Sep 2019 00:13) (110/67 - 123/79)  BP(mean): --  RR: 18 (01 Sep 2019 00:13) (18 - 18)  SpO2: 95% (01 Sep 2019 00:13) (95% - 98%)    PHYSICAL EXAM:  Constitutional:NAD  Eyes:SABRINA, EOMI  Ear/Nose/Throat: no thrush, mucositis.  Moist mucous membranes	  Neck:no JVD, no lymphadenopathy, supple  Respiratory: CTA ynes  Cardiovascular: S1S2 RRR, no murmurs  Gastrointestinal:soft, nontender,  nondistended (+) BS  Extremities:no e/e/c  Skin:  no rashes, open wounds or ulcerations        LABS:                        10.4   9.41  )-----------( 330      ( 31 Aug 2019 10:40 )             30.5     08-31    141  |  105  |  7   ----------------------------<  103<H>  4.2   |  22  |  0.96    Ca    9.9      31 Aug 2019 06:26    TPro  7.2  /  Alb  3.4  /  TBili  0.3  /  DBili  x   /  AST  60<H>  /  ALT  74<H>  /  AlkPhos  76  08-31          Procalcitonin, Serum: 0.10 (08-29 @ 06:21)            Rapid RVP Result: [facility]          MICROBIOLOGY:    Culture - Blood (08.28.19 @ 21:58)    Specimen Source: .Blood    Culture Results:   No growth to date.    Culture - Blood (08.28.19 @ 21:58)    Specimen Source: .Blood    Culture Results:   No growth to date.    Culture - Blood (08.27.19 @ 00:50)    Culture - Blood:   NO ORGANISMS ISOLATED    Specimen Source: BLOOD VENOUS    Culture - Blood (08.27.19 @ 00:50)    Culture - Blood:   NO ORGANISMS ISOLATED    Specimen Source: BLOOD PERIPHERAL      Legionella pneumophila Antigen, Urine (08.29.19 @ 19:27)    Legionella Antigen, Urine: Negative    MRSA/MSSA PCR (08.30.19 @ 15:33)    MRSA PCR Result.: [facility]: MRSA/MSSA PCR assay is a qualitative in vitro diagnostic test for the  direct detection and differentiation of methicillin-resistant  Staphylococcus aureus (MRSA) from Staphylococcus aureus (SA). The assay  detects DNA from nasal swabs in patients atrisk for nasal colonization.  It is not intended to diagnose MRSA or SA infections nor guide or monitor  treatment for MRSA/SA infections. A negative result does not preclude  nasal colonization. The assay is FDA-approved and its performance has  been established by [employer] [place] and the [facility], [place], [place].    Staph Aureus PCR Result: [facility]        RADIOLOGY & ADDITIONAL STUDIES:    < from: CT Chest w/ IV Cont (08.29.19 @ 19:10) >  IMPRESSION:     Left lower lobe pneumonia.    No acute intra-abdominal pathology.    < end of copied text >
Infectious Diseases progress note:    Subjective: Feeling much better.  Cough and sob resolving.  Afebrile.      ROS:  CONSTITUTIONAL:  No fever, chills, rigors  CARDIOVASCULAR:  No chest pain or palpitations  RESPIRATORY:   No SOB, cough, dyspnea on exertion.  No wheezing  GASTROINTESTINAL:  No abd pain, N/V, diarrhea/constipation  EXTREMITIES:  No swelling or joint pain  GENITOURINARY:  No burning on urination, increased frequency or urgency.  No flank pain  NEUROLOGIC:  No HA, visual disturbances  SKIN: No rashes    Allergies    penicillin (Swelling)    Intolerances        ANTIBIOTICS/RELEVANT:  antimicrobials  cefepime   IVPB 1000 milliGRAM(s) IV Intermittent every 8 hours    immunologic:    OTHER:  amLODIPine   Tablet 5 milliGRAM(s) Oral daily  docusate sodium 100 milliGRAM(s) Oral two times a day  folic acid 1 milliGRAM(s) Oral daily  heparin  Injectable 5000 Unit(s) SubCutaneous every 8 hours  losartan 100 milliGRAM(s) Oral daily  nicotine - 21 mG/24Hr(s) Patch 1 patch Transdermal daily  pantoprazole    Tablet 40 milliGRAM(s) Oral before breakfast  senna 1 Tablet(s) Oral at bedtime  thiamine 100 milliGRAM(s) Oral daily      Objective:  Vital Signs Last 24 Hrs  T(C): 36.6 (02 Sep 2019 13:47), Max: 36.9 (01 Sep 2019 21:41)  T(F): 97.9 (02 Sep 2019 13:47), Max: 98.5 (01 Sep 2019 21:41)  HR: 80 (02 Sep 2019 13:45) (70 - 82)  BP: 105/69 (02 Sep 2019 13:45) (103/73 - 117/81)  BP(mean): 81 (02 Sep 2019 13:45) (81 - 81)  RR: 18 (02 Sep 2019 13:47) (18 - 18)  SpO2: 97% (02 Sep 2019 13:47) (96% - 99%)    PHYSICAL EXAM:  Constitutional:NAD  Eyes:SABRINA, EOMI  Ear/Nose/Throat: no thrush, mucositis.  Moist mucous membranes	  Neck:no JVD, no lymphadenopathy, supple  Respiratory: CTA ynes  Cardiovascular: S1S2 RRR, no murmurs  Gastrointestinal:soft, nontender,  nondistended (+) BS  Extremities:no e/e/c  Skin:  no rashes, open wounds or ulcerations        LABS:                        10.9   9.06  )-----------( 451      ( 01 Sep 2019 09:27 )             32.1     09-02    139  |  103  |  12  ----------------------------<  100<H>  4.2   |  24  |  0.90    Ca    9.8      02 Sep 2019 06:37    TPro  7.1  /  Alb  3.6  /  TBili  0.2  /  DBili  x   /  AST  29  /  ALT  47<H>  /  AlkPhos  75  09-02          Procalcitonin, Serum: 0.10 (08-29 @ 06:21)            Rapid RVP Result: [facility]          MICROBIOLOGY:      Culture - Blood (08.28.19 @ 21:58)    Specimen Source: .Blood    Culture Results:   No growth to date.    Culture - Blood (08.28.19 @ 21:58)    Specimen Source: .Blood    Culture Results:   No growth to date.    Culture - Blood (08.27.19 @ 00:50)    Culture - Blood:   NO ORGANISMS ISOLATED    Specimen Source: BLOOD VENOUS        RADIOLOGY & ADDITIONAL STUDIES:    < from: CT Chest w/ IV Cont (08.29.19 @ 19:10) >  IMPRESSION:     Left lower lobe pneumonia.    No acute intra-abdominal pathology.    < end of copied text >
Infectious Diseases progress note:    Subjective: No acute o/n events.  Pt feels well.  Cough/sob resolved.  Afebrile      ROS:  CONSTITUTIONAL:  No fever, chills, rigors  CARDIOVASCULAR:  No chest pain or palpitations  RESPIRATORY:   No SOB, cough, dyspnea on exertion.  No wheezing  GASTROINTESTINAL:  No abd pain, N/V, diarrhea/constipation  EXTREMITIES:  No swelling or joint pain  GENITOURINARY:  No burning on urination, increased frequency or urgency.  No flank pain  NEUROLOGIC:  No HA, visual disturbances  SKIN: No rashes    Allergies    penicillin (Swelling)    Intolerances        ANTIBIOTICS/RELEVANT:  antimicrobials  cefepime   IVPB 1000 milliGRAM(s) IV Intermittent every 8 hours    immunologic:    OTHER:  amLODIPine   Tablet 5 milliGRAM(s) Oral daily  docusate sodium 100 milliGRAM(s) Oral two times a day  folic acid 1 milliGRAM(s) Oral daily  heparin  Injectable 5000 Unit(s) SubCutaneous every 8 hours  losartan 100 milliGRAM(s) Oral daily  nicotine - 21 mG/24Hr(s) Patch 1 patch Transdermal daily  pantoprazole    Tablet 40 milliGRAM(s) Oral before breakfast  senna 1 Tablet(s) Oral at bedtime  thiamine 100 milliGRAM(s) Oral daily      Objective:  Vital Signs Last 24 Hrs  T(C): 36.3 (03 Sep 2019 14:08), Max: 36.9 (02 Sep 2019 21:05)  T(F): 97.3 (03 Sep 2019 14:08), Max: 98.4 (02 Sep 2019 21:05)  HR: 84 (03 Sep 2019 14:08) (73 - 85)  BP: 117/82 (03 Sep 2019 14:08) (102/63 - 123/77)  BP(mean): --  RR: 18 (03 Sep 2019 14:08) (18 - 18)  SpO2: 97% (03 Sep 2019 14:08) (94% - 97%)    PHYSICAL EXAM:  Constitutional:NAD  Eyes:SABRINA, EOMI  Ear/Nose/Throat: no thrush, mucositis.  Moist mucous membranes	  Neck:no JVD, no lymphadenopathy, supple  Respiratory: CTA ynes  Cardiovascular: S1S2 RRR, no murmurs  Gastrointestinal:soft, nontender,  nondistended (+) BS  Extremities:no e/e/c  Skin:  no rashes, open wounds or ulcerations        LABS:    09-02    139  |  103  |  12  ----------------------------<  100<H>  4.2   |  24  |  0.90    Ca    9.8      02 Sep 2019 06:37    TPro  7.1  /  Alb  3.6  /  TBili  0.2  /  DBili  x   /  AST  29  /  ALT  47<H>  /  AlkPhos  75  09-02          Procalcitonin, Serum: 0.10 (08-29 @ 06:21)            Rapid RVP Result: [facility]          MICROBIOLOGY:      Culture - Blood (08.28.19 @ 21:58)    Specimen Source: .Blood    Culture Results:   No growth at 5 days.    Culture - Blood (08.28.19 @ 21:58)    Specimen Source: .Blood    Culture Results:   No growth at 5 days.    Culture - Blood (08.27.19 @ 00:50)    Culture - Blood:   NO ORGANISMS ISOLATED    Specimen Source: BLOOD VENOUS        RADIOLOGY & ADDITIONAL STUDIES:
Infectious Diseases progress note:    Subjective: Overall feeling better.  (+) nonproductive cough.  States "something not right" and feeling dizzy when given zosyn.  No rash, pruritis, sob    ROS:  CONSTITUTIONAL:  No fever, chills, rigors  CARDIOVASCULAR:  No chest pain or palpitations  RESPIRATORY:   No SOB, cough, dyspnea on exertion.  No wheezing  GASTROINTESTINAL:  No abd pain, N/V, diarrhea/constipation  EXTREMITIES:  No swelling or joint pain  GENITOURINARY:  No burning on urination, increased frequency or urgency.  No flank pain  NEUROLOGIC:  No HA, visual disturbances  SKIN: No rashes    Allergies    penicillin (Swelling)    Intolerances        ANTIBIOTICS/RELEVANT:  antimicrobials  piperacillin/tazobactam IVPB.. 3.375 Gram(s) IV Intermittent every 8 hours    immunologic:    OTHER:  amLODIPine   Tablet 5 milliGRAM(s) Oral daily  docusate sodium 100 milliGRAM(s) Oral daily  folic acid 1 milliGRAM(s) Oral daily  heparin  Injectable 5000 Unit(s) SubCutaneous every 8 hours  losartan 100 milliGRAM(s) Oral daily  nicotine - 21 mG/24Hr(s) Patch 1 patch Transdermal daily  pantoprazole    Tablet 40 milliGRAM(s) Oral before breakfast  senna 1 Tablet(s) Oral at bedtime  sodium chloride 0.9%. 1000 milliLiter(s) IV Continuous <Continuous>  sodium chloride 0.9%. 1000 milliLiter(s) IV Continuous <Continuous>  thiamine 100 milliGRAM(s) Oral daily      Objective:  Vital Signs Last 24 Hrs  T(C): 36.6 (30 Aug 2019 17:00), Max: 37.7 (30 Aug 2019 01:00)  T(F): 97.8 (30 Aug 2019 17:00), Max: 99.8 (30 Aug 2019 01:00)  HR: 81 (30 Aug 2019 17:00) (77 - 103)  BP: 113/65 (30 Aug 2019 17:00) (109/66 - 121/75)  BP(mean): --  RR: 18 (30 Aug 2019 17:00) (18 - 18)  SpO2: 94% (30 Aug 2019 17:00) (93% - 96%)    PHYSICAL EXAM:  Constitutional:NAD  Eyes:SABRINA, EOMI  Ear/Nose/Throat: no thrush, mucositis.  Moist mucous membranes	  Neck:no JVD, no lymphadenopathy, supple  Respiratory: CTA ynes  Cardiovascular: S1S2 RRR, no murmurs  Gastrointestinal:soft, nontender,  nondistended (+) BS  Extremities:no e/e/c  Skin:  no rashes, open wounds or ulcerations        LABS:                        9.5    10.72 )-----------( 251      ( 30 Aug 2019 08:37 )             27.6         137  |  102  |  4<L>  ----------------------------<  106<H>  3.6   |  21<L>  |  0.68    Ca    9.5      30 Aug 2019 05:25  Mg     1.9         TPro  6.7  /  Alb  2.9<L>  /  TBili  0.3  /  DBili  x   /  AST  109<H>  /  ALT  85<H>  /  AlkPhos  76        Urinalysis Basic - ( 29 Aug 2019 06:21 )    Color: Light Yellow / Appearance: Clear / S.007 / pH: x  Gluc: x / Ketone: Trace  / Bili: Negative / Urobili: Negative   Blood: x / Protein: Trace / Nitrite: Negative   Leuk Esterase: Negative / RBC: 8 /hpf / WBC 1 /HPF   Sq Epi: x / Non Sq Epi: 1 /hpf / Bacteria: Negative        Procalcitonin, Serum: 0.10 ( @ 06:21)            Rapid RVP Result: [facility]          MICROBIOLOGY:    Culture - Blood (19 @ 21:58)    Specimen Source: .Blood    Culture Results:   No growth to date.    Culture - Blood (19 @ 21:58)    Specimen Source: .Blood    Culture Results:   No growth to date.    Culture - Blood (19 @ 00:50)    Culture - Blood:   NO ORGANISMS ISOLATED  NO ORGANISMS ISOLATED AT 72 HRS.    Specimen Source: BLOOD VENOUS    Culture Results:   No growth to date. (19 @ 21:58)    Culture - Blood (19 @ 00:50)    Culture - Blood:   NO ORGANISMS ISOLATED  NO ORGANISMS ISOLATED AT 72 HRS.    Specimen Source: BLOOD PERIPHERAL    Legionella Antigen, Urine: Negative (19 @ 19:27)          RADIOLOGY & ADDITIONAL STUDIES:    < from: CT Chest w/ IV Cont (19 @ 19:10) >  IMPRESSION:     Left lower lobe pneumonia.    No acute intra-abdominal pathology.    < end of copied text >
Patient is a 59y old  Female who presents with a chief complaint of fever (01 Sep 2019 13:54)      INTERVAL HISTORY: feels much better    REVIEW OF SYSTEMS:  CONSTITUTIONAL: No weakness, fevers or chills  EYES/ENT: No visual changes;  No vertigo or throat pain   NECK: No pain or stiffness  RESPIRATORY: No cough, wheezing, hemoptysis; No shortness of breath  CARDIOVASCULAR: No chest pain or palpitations  GASTROINTESTINAL: No abdominal or epigastric pain. No nausea, vomiting, or hematemesis; No diarrhea or constipation. No melena or hematochezia.  GENITOURINARY: No dysuria, frequency or hematuria  NEUROLOGICAL: No numbness or weakness  SKIN: No itching, rashes      TELEMETRY:  	  MEDICATIONS:  amLODIPine   Tablet 5 milliGRAM(s) Oral daily  losartan 100 milliGRAM(s) Oral daily        PHYSICAL EXAM:  T(C): 36.9 (09-01-19 @ 21:41), Max: 36.9 (09-01-19 @ 21:41)  HR: 82 (09-01-19 @ 21:41) (76 - 82)  BP: 117/81 (09-01-19 @ 21:41) (104/68 - 123/79)  RR: 18 (09-01-19 @ 21:41) (18 - 18)  SpO2: 98% (09-01-19 @ 21:41) (95% - 100%)  Wt(kg): --  I&O's Summary    31 Aug 2019 07:01  -  01 Sep 2019 07:00  --------------------------------------------------------  IN: 290 mL / OUT: 0 mL / NET: 290 mL          Appearance: In no distress	  HEENT:    PERRL, EOMI	  Cardiovascular:  S1 S2, No JVD  Respiratory: Lungs clear to auscultation	  Gastrointestinal:  Soft, Non-tender, + BS	  Extremities:  No edema of LE                                10.9   9.06  )-----------( 451      ( 01 Sep 2019 09:27 )             32.1     09-01    141  |  106  |  9   ----------------------------<  101<H>  3.9   |  21<L>  |  0.87    Ca    10.0      01 Sep 2019 06:43    TPro  7.2  /  Alb  3.4  /  TBili  0.2  /  DBili  x   /  AST  36  /  ALT  57<H>  /  AlkPhos  76  09-01        Labs personally reviewed      Assessment and Plan:   · Assessment		  Pt is a 58 y/o Female with pmhx of HTN, DM presents to ER c/o of dizziness, "shaking" and fevers x1week.     Problem/Plan - 1:  ·  Problem: Fevers.  Plan: Pneumonia likely   CT abd/Pelv and chest reviewed   TTE to ruled out endocardtis      Problem/Plan - 2:  Problem: HTN (hypertension). Plan: norvasc 5 and valsartan, losartan 100   BP well controlled        Phillip Melara DO [facility]  Cardiovascular Medicine  [phone]
Patient is a 59y old  Female who presents with a chief complaint of fever (03 Sep 2019 15:51)      INTERVAL HISTORY: feels ok       	  MEDICATIONS:  amLODIPine   Tablet 5 milliGRAM(s) Oral daily  losartan 100 milliGRAM(s) Oral daily        PHYSICAL EXAM:  T(C): 36.3 (09-03-19 @ 14:08), Max: 36.9 (09-02-19 @ 21:05)  HR: 84 (09-03-19 @ 14:08) (73 - 85)  BP: 117/82 (09-03-19 @ 14:08) (102/63 - 123/77)  RR: 18 (09-03-19 @ 14:08) (18 - 18)  SpO2: 97% (09-03-19 @ 14:08) (94% - 97%)  Wt(kg): --  I&O's Summary    02 Sep 2019 07:01  -  03 Sep 2019 07:00  --------------------------------------------------------  IN: 750 mL / OUT: 0 mL / NET: 750 mL          Appearance: In no distress	  HEENT:    PERRL, EOMI	  Cardiovascular:  S1 S2, No JVD  Respiratory: Lungs clear to auscultation	  Gastrointestinal:  Soft, Non-tender, + BS	  Extremities:  No edema of LE            09-02    139  |  103  |  12  ----------------------------<  100<H>  4.2   |  24  |  0.90    Ca    9.8      02 Sep 2019 06:37    TPro  7.1  /  Alb  3.6  /  TBili  0.2  /  DBili  x   /  AST  29  /  ALT  47<H>  /  AlkPhos  75  09-02        Labs personally reviewed      Assessment and Plan:   · Assessment		  Pt is a 60 y/o Female with pmhx of HTN, DM presents to ER c/o of dizziness, "shaking" and fevers x1week.     Problem/Plan - 1:  ·  Problem: Fevers.  Plan: Pneumonia likely   CT abd/Pelv and chest reviewed   TTE to ruled out endocardtis      Problem/Plan - 2:  Problem: HTN (hypertension). Plan: norvasc 5 and valsartan, losartan 100   BP well controlled    d/c home with outpt follow up within 7-10 days    Phillip Melara DO [facility]  Cardiovascular Medicine  [phone]
Patient is a 59y old  Female who presents with a chief complaint of fever (30 Aug 2019 17:25)      INTERVAL HISTORY: feels ok      	  MEDICATIONS:  amLODIPine   Tablet 5 milliGRAM(s) Oral daily  losartan 100 milliGRAM(s) Oral daily        PHYSICAL EXAM:  T(C): 36.6 (08-30-19 @ 17:00), Max: 37.7 (08-30-19 @ 01:00)  HR: 81 (08-30-19 @ 17:00) (77 - 103)  BP: 113/65 (08-30-19 @ 17:00) (109/66 - 121/75)  RR: 18 (08-30-19 @ 17:00) (18 - 18)  SpO2: 94% (08-30-19 @ 17:00) (93% - 96%)  Wt(kg): --  I&O's Summary    29 Aug 2019 07:01  -  30 Aug 2019 07:00  --------------------------------------------------------  IN: 820 mL / OUT: 500 mL / NET: 320 mL    30 Aug 2019 07:01  -  30 Aug 2019 19:16  --------------------------------------------------------  IN: 400 mL / OUT: 300 mL / NET: 100 mL          Appearance: In no distress	  HEENT:    PERRL, EOMI	  Cardiovascular:  S1 S2, No JVD  Respiratory: Lungs clear to auscultation	  Gastrointestinal:  Soft, Non-tender, + BS	  Extremities:  No edema of LE                                9.5    10.72 )-----------( 251      ( 30 Aug 2019 08:37 )             27.6     08-30    137  |  102  |  4<L>  ----------------------------<  106<H>  3.6   |  21<L>  |  0.68    Ca    9.5      30 Aug 2019 05:25  Mg     1.9     08-29    TPro  6.7  /  Alb  2.9<L>  /  TBili  0.3  /  DBili  x   /  AST  109<H>  /  ALT  85<H>  /  AlkPhos  76  08-30        Labs personally reviewed      EKG: Personally reviewed by me - nsr with junctional ST dep  Radiology: Personally reviewed by me - cxr left Lower lobe PNA    Assessment and Plan:   · Assessment		  Pt is a 58 y/o Female with pmhx of HTN, DM presents to ER c/o of dizziness, "shaking" and fevers x1week.     Problem/Plan - 1:  ·  Problem: Fevers.  Plan: Pneumonia likely   CT abd/Pelv and chest reviewed   TTE to ruled out endocardtis      Problem/Plan - 2:  Problem: HTN (hypertension). Plan: norvasc 5 and valsartan, can switch to losartan 100   monitor BP and titrate as tolerated.          Phillip Melara DO [facility]  Cardiovascular Medicine  [phone]
Subjective: Patient seen and examined. No new events except as noted.   doing well       REVIEW OF SYSTEMS:    CONSTITUTIONAL: No weakness, fevers or chills  EYES/ENT: No visual changes;  No vertigo or throat pain   NECK: No pain or stiffness  RESPIRATORY: No cough, wheezing, hemoptysis; No shortness of breath  CARDIOVASCULAR: No chest pain or palpitations  GASTROINTESTINAL: No abdominal or epigastric pain. No nausea, vomiting, or hematemesis; No diarrhea or constipation. No melena or hematochezia.  GENITOURINARY: No dysuria, frequency or hematuria  NEUROLOGICAL: No numbness or weakness  SKIN: No itching, burning, rashes, or lesions   All other review of systems is negative unless indicated above.    MEDICATIONS:  MEDICATIONS  (STANDING):  amLODIPine   Tablet 5 milliGRAM(s) Oral daily  cefepime   IVPB 1000 milliGRAM(s) IV Intermittent every 8 hours  docusate sodium 100 milliGRAM(s) Oral daily  folic acid 1 milliGRAM(s) Oral daily  heparin  Injectable 5000 Unit(s) SubCutaneous every 8 hours  losartan 100 milliGRAM(s) Oral daily  nicotine - 21 mG/24Hr(s) Patch 1 patch Transdermal daily  pantoprazole    Tablet 40 milliGRAM(s) Oral before breakfast  senna 1 Tablet(s) Oral at bedtime  thiamine 100 milliGRAM(s) Oral daily      PHYSICAL EXAM:  T(C): 36.6 (08-31-19 @ 09:09), Max: 36.9 (08-31-19 @ 05:47)  HR: 69 (08-31-19 @ 09:09) (69 - 97)  BP: 121/75 (08-31-19 @ 09:09) (109/66 - 121/75)  RR: 18 (08-31-19 @ 09:09) (18 - 18)  SpO2: 97% (08-31-19 @ 09:09) (93% - 97%)  Wt(kg): --  I&O's Summary    30 Aug 2019 07:01  -  31 Aug 2019 07:00  --------------------------------------------------------  IN: 940 mL / OUT: 1200 mL / NET: -260 mL          Appearance: Normal	  HEENT:   Normal oral mucosa, PERRL, EOMI	  Lymphatic: No lymphadenopathy , no edema  Cardiovascular: Normal S1 S2, No JVD, No murmurs , Peripheral pulses palpable 2+ bilaterally  Respiratory: LLL rales   Gastrointestinal:  Soft, Non-tender, + BS	  Skin: No rashes, No ecchymoses, No cyanosis, warm to touch  Musculoskeletal: Normal range of motion, normal strength  Psychiatry:  Mood & affect appropriate  Ext: No edema      All labs, Imaging and EKGs personally reviewed                             10.4   9.41  )-----------( 330      ( 31 Aug 2019 10:40 )             30.5               08-31    141  |  105  |  7   ----------------------------<  103<H>  4.2   |  22  |  0.96    Ca    9.9      31 Aug 2019 06:26    TPro  7.2  /  Alb  3.4  /  TBili  0.3  /  DBili  x   /  AST  60<H>  /  ALT  74<H>  /  AlkPhos  76  08-31
Subjective: Patient seen and examined. No new events except as noted.   doing well      REVIEW OF SYSTEMS:    CONSTITUTIONAL: No weakness, fevers or chills  EYES/ENT: No visual changes;  No vertigo or throat pain   NECK: No pain or stiffness  RESPIRATORY: No cough, wheezing, hemoptysis; No shortness of breath  CARDIOVASCULAR: No chest pain or palpitations  GASTROINTESTINAL: No abdominal or epigastric pain. No nausea, vomiting, or hematemesis; No diarrhea or constipation. No melena or hematochezia.  GENITOURINARY: No dysuria, frequency or hematuria  NEUROLOGICAL: No numbness or weakness  SKIN: No itching, burning, rashes, or lesions   All other review of systems is negative unless indicated above.    MEDICATIONS:  MEDICATIONS  (STANDING):  amLODIPine   Tablet 5 milliGRAM(s) Oral daily  cefepime   IVPB 1000 milliGRAM(s) IV Intermittent every 8 hours  docusate sodium 100 milliGRAM(s) Oral daily  folic acid 1 milliGRAM(s) Oral daily  heparin  Injectable 5000 Unit(s) SubCutaneous every 8 hours  losartan 100 milliGRAM(s) Oral daily  nicotine - 21 mG/24Hr(s) Patch 1 patch Transdermal daily  pantoprazole    Tablet 40 milliGRAM(s) Oral before breakfast  senna 1 Tablet(s) Oral at bedtime  thiamine 100 milliGRAM(s) Oral daily      PHYSICAL EXAM:  T(C): 36.3 (09-01-19 @ 09:20), Max: 36.8 (09-01-19 @ 05:09)  HR: 77 (09-01-19 @ 09:20) (71 - 80)  BP: 108/71 (09-01-19 @ 09:20) (108/71 - 123/79)  RR: 18 (09-01-19 @ 09:20) (18 - 18)  SpO2: 100% (09-01-19 @ 09:20) (95% - 100%)  Wt(kg): --  I&O's Summary    31 Aug 2019 07:01  -  01 Sep 2019 07:00  --------------------------------------------------------  IN: 290 mL / OUT: 0 mL / NET: 290 mL          Appearance: Normal	  HEENT:   Normal oral mucosa, PERRL, EOMI	  Lymphatic: No lymphadenopathy , no edema  Cardiovascular: Normal S1 S2, No JVD, No murmurs , Peripheral pulses palpable 2+ bilaterally  Respiratory: rales LLL  Gastrointestinal:  Soft, Non-tender, + BS	  Skin: No rashes, No ecchymoses, No cyanosis, warm to touch  Musculoskeletal: Normal range of motion, normal strength  Psychiatry:  Mood & affect appropriate  Ext: No edema      All labs, Imaging and EKGs personally reviewed                             10.9   9.06  )-----------( 451      ( 01 Sep 2019 09:27 )             32.1               09-01    141  |  106  |  9   ----------------------------<  101<H>  3.9   |  21<L>  |  0.87    Ca    10.0      01 Sep 2019 06:43    TPro  7.2  /  Alb  3.4  /  TBili  0.2  /  DBili  x   /  AST  36  /  ALT  57<H>  /  AlkPhos  76  09-01
Subjective: Patient seen and examined. No new events except as noted.   episode of lightheadedness today with drop in BP   otherwise doing well       REVIEW OF SYSTEMS:    CONSTITUTIONAL: No weakness, fevers or chills  EYES/ENT: No visual changes;  No vertigo or throat pain   NECK: No pain or stiffness  RESPIRATORY: No cough, wheezing, hemoptysis; No shortness of breath  CARDIOVASCULAR: No chest pain or palpitations  GASTROINTESTINAL: No abdominal or epigastric pain. No nausea, vomiting, or hematemesis; No diarrhea or constipation. No melena or hematochezia.  GENITOURINARY: No dysuria, frequency or hematuria  NEUROLOGICAL: No numbness or weakness  SKIN: No itching, burning, rashes, or lesions   All other review of systems is negative unless indicated above.    MEDICATIONS:  MEDICATIONS  (STANDING):  amLODIPine   Tablet 5 milliGRAM(s) Oral daily  cefepime   IVPB 1000 milliGRAM(s) IV Intermittent every 8 hours  docusate sodium 100 milliGRAM(s) Oral two times a day  folic acid 1 milliGRAM(s) Oral daily  heparin  Injectable 5000 Unit(s) SubCutaneous every 8 hours  losartan 100 milliGRAM(s) Oral daily  nicotine - 21 mG/24Hr(s) Patch 1 patch Transdermal daily  pantoprazole    Tablet 40 milliGRAM(s) Oral before breakfast  senna 1 Tablet(s) Oral at bedtime  sodium chloride 0.9% Bolus 500 milliLiter(s) IV Bolus once  thiamine 100 milliGRAM(s) Oral daily      PHYSICAL EXAM:  T(C): 36.3 (09-03-19 @ 14:08), Max: 36.9 (09-02-19 @ 21:05)  HR: 84 (09-03-19 @ 14:08) (73 - 85)  BP: 117/82 (09-03-19 @ 14:08) (102/63 - 123/77)  RR: 18 (09-03-19 @ 14:08) (18 - 18)  SpO2: 97% (09-03-19 @ 14:08) (94% - 97%)  Wt(kg): --  I&O's Summary    02 Sep 2019 07:01  -  03 Sep 2019 07:00  --------------------------------------------------------  IN: 750 mL / OUT: 0 mL / NET: 750 mL          Appearance: Normal	  HEENT:   Normal oral mucosa, PERRL, EOMI	  Lymphatic: No lymphadenopathy , no edema  Cardiovascular: Normal S1 S2, No JVD, No murmurs , Peripheral pulses palpable 2+ bilaterally  Respiratory: Lungs clear to auscultation, normal effort 	  Gastrointestinal:  Soft, Non-tender, + BS	  Skin: No rashes, No ecchymoses, No cyanosis, warm to touch  Musculoskeletal: Normal range of motion, normal strength  Psychiatry:  Mood & affect appropriate  Ext: No edema      All labs, Imaging and EKGs personally reviewed                 09-02    139  |  103  |  12  ----------------------------<  100<H>  4.2   |  24  |  0.90    Ca    9.8      02 Sep 2019 06:37    TPro  7.1  /  Alb  3.6  /  TBili  0.2  /  DBili  x   /  AST  29  /  ALT  47<H>  /  AlkPhos  75  09-02
Subjective: Patient seen and examined. No new events except as noted.     REVIEW OF SYSTEMS:    CONSTITUTIONAL: No weakness, fevers or chills  EYES/ENT: No visual changes;  No vertigo or throat pain   NECK: No pain or stiffness  RESPIRATORY: No cough, wheezing, hemoptysis; No shortness of breath  CARDIOVASCULAR: No chest pain or palpitations  GASTROINTESTINAL: No abdominal or epigastric pain. No nausea, vomiting, or hematemesis; No diarrhea or constipation. No melena or hematochezia.  GENITOURINARY: No dysuria, frequency or hematuria  NEUROLOGICAL: No numbness or weakness  SKIN: No itching, burning, rashes, or lesions   All other review of systems is negative unless indicated above.    MEDICATIONS:  MEDICATIONS  (STANDING):  amLODIPine   Tablet 5 milliGRAM(s) Oral daily  cefepime   IVPB 1000 milliGRAM(s) IV Intermittent every 8 hours  docusate sodium 100 milliGRAM(s) Oral two times a day  folic acid 1 milliGRAM(s) Oral daily  heparin  Injectable 5000 Unit(s) SubCutaneous every 8 hours  losartan 100 milliGRAM(s) Oral daily  nicotine - 21 mG/24Hr(s) Patch 1 patch Transdermal daily  pantoprazole    Tablet 40 milliGRAM(s) Oral before breakfast  senna 1 Tablet(s) Oral at bedtime  thiamine 100 milliGRAM(s) Oral daily      PHYSICAL EXAM:  T(C): 36.8 (09-02-19 @ 17:05), Max: 36.9 (09-01-19 @ 21:41)  HR: 73 (09-02-19 @ 17:05) (70 - 82)  BP: 118/77 (09-02-19 @ 17:05) (103/73 - 118/77)  RR: 18 (09-02-19 @ 17:05) (18 - 18)  SpO2: 96% (09-02-19 @ 17:05) (96% - 99%)  Wt(kg): --  I&O's Summary    01 Sep 2019 07:01  -  02 Sep 2019 07:00  --------------------------------------------------------  IN: 580 mL / OUT: 0 mL / NET: 580 mL    02 Sep 2019 07:01  -  02 Sep 2019 18:38  --------------------------------------------------------  IN: 50 mL / OUT: 0 mL / NET: 50 mL          Appearance: Normal	  HEENT:   Normal oral mucosa, PERRL, EOMI	  Lymphatic: No lymphadenopathy , no edema  Cardiovascular: Normal S1 S2, No JVD, No murmurs , Peripheral pulses palpable 2+ bilaterally  Respiratory: Lungs clear to auscultation, normal effort 	  Gastrointestinal:  Soft, Non-tender, + BS	  Skin: No rashes, No ecchymoses, No cyanosis, warm to touch  Musculoskeletal: Normal range of motion, normal strength  Psychiatry:  Mood & affect appropriate  Ext: No edema      All labs, Imaging and EKGs personally reviewed                           10.9   9.06  )-----------( 451      ( 01 Sep 2019 09:27 )             32.1               09-02    139  |  103  |  12  ----------------------------<  100<H>  4.2   |  24  |  0.90    Ca    9.8      02 Sep 2019 06:37    TPro  7.1  /  Alb  3.6  /  TBili  0.2  /  DBili  x   /  AST  29  /  ALT  47<H>  /  AlkPhos  75  09-02
Subjective: Patient seen and examined. No new events except as noted.   febrile overnight     REVIEW OF SYSTEMS:    CONSTITUTIONAL: No weakness, fevers or chills  EYES/ENT: No visual changes;  No vertigo or throat pain   NECK: No pain or stiffness  RESPIRATORY: No cough, wheezing, hemoptysis; No shortness of breath  CARDIOVASCULAR: No chest pain or palpitations  GASTROINTESTINAL: No abdominal or epigastric pain. No nausea, vomiting, or hematemesis; No diarrhea or constipation. No melena or hematochezia.  GENITOURINARY: No dysuria, frequency or hematuria  NEUROLOGICAL: No numbness or weakness  SKIN: No itching, burning, rashes, or lesions   All other review of systems is negative unless indicated above.    MEDICATIONS:  MEDICATIONS  (STANDING):  amLODIPine   Tablet 5 milliGRAM(s) Oral daily  azithromycin  IVPB 500 milliGRAM(s) IV Intermittent every 24 hours  folic acid 1 milliGRAM(s) Oral daily  heparin  Injectable 5000 Unit(s) SubCutaneous every 8 hours  losartan 100 milliGRAM(s) Oral daily  nicotine - 21 mG/24Hr(s) Patch 1 patch Transdermal daily  pantoprazole    Tablet 40 milliGRAM(s) Oral before breakfast  piperacillin/tazobactam IVPB.. 3.375 Gram(s) IV Intermittent every 8 hours  sodium chloride 0.9%. 1000 milliLiter(s) (100 mL/Hr) IV Continuous <Continuous>  thiamine 100 milliGRAM(s) Oral daily  vancomycin  IVPB 1000 milliGRAM(s) IV Intermittent every 12 hours      PHYSICAL EXAM:  T(C): 37.6 (19 @ 17:18), Max: 39 (19 @ 21:19)  HR: 95 (19 @ 17:18) (79 - 103)  BP: 114/69 (19 @ 17:18) (92/51 - 124/74)  RR: 18 (19 @ 17:18) (18 - 20)  SpO2: 94% (19 @ 17:18) (93% - 97%)  Wt(kg): --  I&O's Summary    28 Aug 2019 07:01  -  29 Aug 2019 07:00  --------------------------------------------------------  IN: 1870 mL / OUT: 0 mL / NET: 1870 mL      Height (cm): 162.6 ( @ 19:44)  Weight (kg): 78.1 ( @ 19:44)  BMI (kg/m2): 29.5 ( @ 19:44)  BSA (m2): 1.84 ( @ 19:44)    Appearance: Normal	  HEENT:   Normal oral mucosa, PERRL, EOMI	  Lymphatic: No lymphadenopathy , no edema  Cardiovascular: Normal S1 S2, No JVD, No murmurs , Peripheral pulses palpable 2+ bilaterally  Respiratory: LLL rales, no wheezing   Gastrointestinal:  Soft, Non-tender, + BS	  Skin: No rashes, No ecchymoses, No cyanosis, warm to touch  Musculoskeletal: Normal range of motion, normal strength  Psychiatry:  Mood & affect appropriate  Ext: No edema      All labs, Imaging and EKGs personally reviewed                             10.6   10.94 )-----------( 238      ( 29 Aug 2019 09:24 )             31.4                   137  |  104  |  8   ----------------------------<  93  3.5   |  20<L>  |  0.68    Ca    9.3      29 Aug 2019 06:21  Mg     1.9         TPro  7.2  /  Alb  3.4  /  TBili  0.4  /  DBili  0.1  /  AST  136<H>  /  ALT  82<H>  /  AlkPhos  81                         Urinalysis Basic - ( 29 Aug 2019 06:21 )    Color: Light Yellow / Appearance: Clear / S.007 / pH: x  Gluc: x / Ketone: Trace  / Bili: Negative / Urobili: Negative   Blood: x / Protein: Trace / Nitrite: Negative   Leuk Esterase: Negative / RBC: 8 /hpf / WBC 1 /HPF   Sq Epi: x / Non Sq Epi: 1 /hpf / Bacteria: Negative      < from: Xray Chest 2 Views PA/Lat (19 @ 18:38) >  IMPRESSION:   Left lower lobe pneumonia.
Subjective: Patient seen and examined. No new events except as noted.   feeling much better  cough     REVIEW OF SYSTEMS:    CONSTITUTIONAL: No weakness, fevers or chills  EYES/ENT: No visual changes;  No vertigo or throat pain   NECK: No pain or stiffness  RESPIRATORY: + COugh   CARDIOVASCULAR: No chest pain or palpitations  GASTROINTESTINAL: No abdominal or epigastric pain. No nausea, vomiting, or hematemesis; No diarrhea or constipation. No melena or hematochezia.  GENITOURINARY: No dysuria, frequency or hematuria  NEUROLOGICAL: No numbness or weakness  SKIN: No itching, burning, rashes, or lesions   All other review of systems is negative unless indicated above.    MEDICATIONS:  MEDICATIONS  (STANDING):  amLODIPine   Tablet 5 milliGRAM(s) Oral daily  azithromycin  IVPB 500 milliGRAM(s) IV Intermittent every 24 hours  docusate sodium 100 milliGRAM(s) Oral daily  folic acid 1 milliGRAM(s) Oral daily  heparin  Injectable 5000 Unit(s) SubCutaneous every 8 hours  losartan 100 milliGRAM(s) Oral daily  nicotine - 21 mG/24Hr(s) Patch 1 patch Transdermal daily  pantoprazole    Tablet 40 milliGRAM(s) Oral before breakfast  piperacillin/tazobactam IVPB.. 3.375 Gram(s) IV Intermittent every 8 hours  senna 1 Tablet(s) Oral at bedtime  sodium chloride 0.9%. 1000 milliLiter(s) (100 mL/Hr) IV Continuous <Continuous>  sodium chloride 0.9%. 1000 milliLiter(s) (75 mL/Hr) IV Continuous <Continuous>  thiamine 100 milliGRAM(s) Oral daily      PHYSICAL EXAM:  T(C): 36.5 (19 @ 13:00), Max: 37.7 (19 @ 01:00)  HR: 83 (19 @ 13:00) (77 - 103)  BP: 109/66 (19 @ 13:00) (109/66 - 121/75)  RR: 18 (19 @ 13:00) (18 - 18)  SpO2: 95% (19 @ 13:00) (93% - 96%)  Wt(kg): --  I&O's Summary    29 Aug 2019 07:01  -  30 Aug 2019 07:00  --------------------------------------------------------  IN: 820 mL / OUT: 500 mL / NET: 320 mL    30 Aug 2019 07:01  -  30 Aug 2019 16:55  --------------------------------------------------------  IN: 400 mL / OUT: 300 mL / NET: 100 mL          Appearance: Normal	  HEENT:   Normal oral mucosa, PERRL, EOMI	  Lymphatic: No lymphadenopathy , no edema  Cardiovascular: Normal S1 S2, No JVD, No murmurs , Peripheral pulses palpable 2+ bilaterally  Respiratory: LLL rales  	  Gastrointestinal:  Soft, Non-tender, + BS	  Skin: No rashes, No ecchymoses, No cyanosis, warm to touch  Musculoskeletal: Normal range of motion, normal strength  Psychiatry:  Mood & affect appropriate  Ext: No edema      All labs, Imaging and EKGs personally reviewed                             9.5    10.72 )-----------( 251      ( 30 Aug 2019 08:37 )             27.6               08    137  |  102  |  4<L>  ----------------------------<  106<H>  3.6   |  21<L>  |  0.68    Ca    9.5      30 Aug 2019 05:25  Mg     1.9         TPro  6.7  /  Alb  2.9<L>  /  TBili  0.3  /  DBili  x   /  AST  109<H>  /  ALT  85<H>  /  AlkPhos  76                         Urinalysis Basic - ( 29 Aug 2019 06:21 )    Color: Light Yellow / Appearance: Clear / S.007 / pH: x  Gluc: x / Ketone: Trace  / Bili: Negative / Urobili: Negative   Blood: x / Protein: Trace / Nitrite: Negative   Leuk Esterase: Negative / RBC: 8 /hpf / WBC 1 /HPF   Sq Epi: x / Non Sq Epi: 1 /hpf / Bacteria: Negative    < from: CT Chest w/ IV Cont (19 @ 19:10) >  IMPRESSION:     Left lower lobe pneumonia.    No acute intra-abdominal pathology.        < from: Transthoracic Echocardiogram (19 @ 14:22) >  Conclusions:  1. Normal mitral valve. Minimal mitral regurgitation.  2. Normal trileaflet aortic valve.  3. Normal left ventricular systolic function. No segmental  wall motion abnormalities.  4. Normal right ventricular size and function.  *** No obvious vegetations. Consider MADELINE if clinically  indicated.  *** No previous Echo exam.

## 2019-09-03 NOTE — PROGRESS NOTE ADULT - ATTENDING COMMENTS
Advanced care planning was discussed with patient and family.  Advanced care planning forms were reviewed and discussed.
Advanced care planning was discussed with patient and family.  Advanced care planning forms were reviewed and discussed.  Differential diagnosis and plan of care discussed with patient after the evaluation.   Pain assessed and judicious use of narcotics when appropriate was discussed.  Importance of Fall prevention discussed.  Counseling on Smoking and Alcohol cessation was offered when appropriate.  Counseling on Diet, exercise, and medication compliance was done.
Advanced care planning was discussed with patient and family.  Advanced care planning forms were reviewed and discussed.    D/c planing

## 2019-09-03 NOTE — PROGRESS NOTE ADULT - PROBLEM SELECTOR PLAN 7
DVT and GI PPX
norvasc 5 and valsartan, can switch to losartan 100   monitor BP and titrate as tolerated

## 2019-09-03 NOTE — PROGRESS NOTE ADULT - PROBLEM SELECTOR PLAN 2
PAUL protocol PRN, currently no tremors or AMS   IV fluid   monitor for fall
Pneumonia seen on CXR   LLL PNA on CT   CT abd/Pelv and chest noted   trend fever  Can D/C azithro and vanco   Cont cefepime   pan cx   Echocardiogram, no Hx of Drug use  incentive spintometer  D/C to [facility]
PAUL protocol PRN, currently no tremors or AMS   IV fluid   monitor for fall

## 2019-09-03 NOTE — DIETITIAN INITIAL EVALUATION ADULT. - PERTINENT MEDS FT
MEDICATIONS  (STANDING):  amLODIPine   Tablet 5 milliGRAM(s) Oral daily  cefepime   IVPB 1000 milliGRAM(s) IV Intermittent every 8 hours  docusate sodium 100 milliGRAM(s) Oral two times a day  folic acid 1 milliGRAM(s) Oral daily  heparin  Injectable 5000 Unit(s) SubCutaneous every 8 hours  losartan 100 milliGRAM(s) Oral daily  nicotine - 21 mG/24Hr(s) Patch 1 patch Transdermal daily  pantoprazole    Tablet 40 milliGRAM(s) Oral before breakfast  senna 1 Tablet(s) Oral at bedtime  thiamine 100 milliGRAM(s) Oral daily    MEDICATIONS  (PRN):

## 2019-09-03 NOTE — PROGRESS NOTE ADULT - PROBLEM SELECTOR PROBLEM 4
Electrolyte imbalance
Electrolyte imbalance
Anemia
Electrolyte imbalance

## 2019-09-03 NOTE — PROGRESS NOTE ADULT - PROBLEM SELECTOR PLAN 3
chronic disease   Anemia W/U
chronic disease   Anemia W/U
PAUL protocol PRN, currently no tremors or AMS   S/P IV fluid   monitor for fall
chronic disease   Anemia W/U

## 2019-09-03 NOTE — DIETITIAN INITIAL EVALUATION ADULT. - ENERGY NEEDS
Height: 64 inches, Weight: 172 pounds  BMI: 29.5 kg/m2 IBW:120 pounds (+/-10%), %IBW: 143%  Pertinent Info: Per chart, 58 y/o female with PMH of HTN, DM or preDM per pt, EtOH abuse, admitted with dizziness and shakiness with fever, PNA. No edema, no pressure ulcers noted at this time.

## 2019-09-03 NOTE — PROVIDER CONTACT NOTE (OTHER) - ACTION/TREATMENT ORDERED:
will order 400 motrin
will continue to monitor patient.
Will supplement if necessary depending on morning lab result
continue to monitor

## 2019-09-03 NOTE — PROVIDER CONTACT NOTE (OTHER) - ASSESSMENT
vitals as charted. patient denies pain, sob, palpations. negative orthostatic
pt asymptomatic at this time. VS as charted. Manual BP SBP > 100
pt is asymptomatic at this time. VS as charted
pt is A.O x 4. VS as charted.

## 2019-09-03 NOTE — PROGRESS NOTE ADULT - PROVIDER SPECIALTY LIST ADULT
Cardiology
Infectious Disease
Internal Medicine
Cardiology
Internal Medicine

## 2019-09-03 NOTE — DIETITIAN INITIAL EVALUATION ADULT. - OTHER INFO
Pt reports appetite decreased 3 days PTA 2/2 fever and overall not feeling well, ate less and drank Ensure supplements. Pt reports appetite back up to baseline since being admitted in the hospital, enjoys the meals served in-house and reports 100% po intakes of meals. No GI distress, +BM yesterday. Pt denies chewing/swallowing difficulties. NKFA. Pt reports she was diagnosed with pre diabetes, follows low sodium diet at home for blood pressure and monitors carbohydrate consumption. Pt denies any recent wt changes, reports UBW as 172-179 pounds.

## 2019-09-03 NOTE — DIETITIAN INITIAL EVALUATION ADULT. - ADD RECOMMEND
1) Continue to monitor weight, lab values, and diet tolerance. 2) Pt denies having any questions about following modified diet at home, RD availability made known to pt.

## 2019-09-03 NOTE — PROGRESS NOTE ADULT - PROBLEM SELECTOR PROBLEM 7
Prophylactic measure
HTN (hypertension)
Prophylactic measure
Prophylactic measure

## 2019-09-03 NOTE — PROGRESS NOTE ADULT - ASSESSMENT
Pt is a 58 y/o Female with pmhx of HTN, DM presents to ER c/o of dizziness, "shaking" and fevers x1week.
Pt is a 58 y/o Female with pmhx of HTN, DM presents to ER c/o of dizziness, "shaking" and fevers x1week.
Pt is a 58 y/o Female with pmhx of HTN, DM presents to ER c/o of dizziness, "shaking" and fevers x1week.  "im in alcohol withdrawal".  patient reports was on a 5 day briggs where she drank a bottle of red wine nightly, although admits to drinking a bottle of wine most nights of the week.  The day after began feeling shaky, and having fevers, admit she does feel like that sometimes after drinking but able to control "withdrawal" with Tylenol PM.  Reports symptoms persisted so went to [facility] 2 days ago and was diagnosed with UTi- has been taking keflex for symptoms.  Reports daily fevers - Tmax 103 Reports still feeling dizziness and chills - head swinging sensation.  Denies acute visual changes, numbness/tinglng, falls, head trauma, abdominal pain, n/v. (28 Aug 2019 18:47)    ER vitals:  Tm 102.2, P 103, BP 95/56.  WBC 14.1 --> 10.9.  LFTs elevated (AST > ALT).  Blood alcohol neg.  uA (-) nit/LE.  CXR LLL pna.   Pt given dose of vanco/zosyn.  Pt states she was recently treated for UTI, and was having difficulty urinating, requiring her strain.  Pt states she has chronic cough from smoking, has not noticed any worsening, no sputum production, cp, sob, or pleurisy.  Pt was recently visiting her granddaughter in [place], during which time she took her granddaughter to ER for high fevers.      ID consult called for further abx managment.     Sepsis:    - Pt a/w fever, mild tachycardia, leukocytosis. Suspect source is LLL pna as seen on cxr imaging.  Cont broad spectrum abx to treat infectious focus.    - f/u  blood cx, urine cx, UA - NGTD    - Monitor lactate, check procalcitonin.  Monitor pulse ox and hemodynamic status.  Pt is currently clinically stable.      - f/u diagnostic imaging studies to r/o alternate source of infection - CT c/a/p - also demonstrates LLL patchy opacity      LLL pna:    - Cxr and CT chest shows L sided opacity    - Cont broad spectrum abx.  f/u MRSA nasal pcr. (vanco d/c'd).  Pt states intolerance to zosyn (c/o dizziness) - change to cefepime 1gm IV q8.      - Legionella Ag (-), can d/c azithromycin.       Transaminitis:    - AST > ALT, possible from ETOH.  f/u ctap.    Trend LFTS.      - Check hepatitis panel, ebv, cmv      Will follow,    d/w pt at bedside.       Anne Roberson  [phone]
Pt is a 58 y/o Female with pmhx of HTN, DM presents to ER c/o of dizziness, "shaking" and fevers x1week.  "im in alcohol withdrawal".  patient reports was on a 5 day briggs where she drank a bottle of red wine nightly, although admits to drinking a bottle of wine most nights of the week.  The day after began feeling shaky, and having fevers, admit she does feel like that sometimes after drinking but able to control "withdrawal" with Tylenol PM.  Reports symptoms persisted so went to [facility] 2 days ago and was diagnosed with UTi- has been taking keflex for symptoms.  Reports daily fevers - Tmax 103 Reports still feeling dizziness and chills - head swinging sensation.  Denies acute visual changes, numbness/tinglng, falls, head trauma, abdominal pain, n/v. (28 Aug 2019 18:47)    ER vitals:  Tm 102.2, P 103, BP 95/56.  WBC 14.1 --> 10.9.  LFTs elevated (AST > ALT).  Blood alcohol neg.  uA (-) nit/LE.  CXR LLL pna.   Pt given dose of vanco/zosyn.  Pt states she was recently treated for UTI, and was having difficulty urinating, requiring her strain.  Pt states she has chronic cough from smoking, has not noticed any worsening, no sputum production, cp, sob, or pleurisy.  Pt was recently visiting her granddaughter in [place], during which time she took her granddaughter to ER for high fevers.      ID consult called for further abx managment.     Sepsis:    - Pt a/w fever, mild tachycardia, leukocytosis. Suspect source is LLL pna as seen on cxr imaging.  Cont broad spectrum abx to treat infectious focus.    - f/u  blood cx, urine cx, UA - NGTD    - Monitor lactate, check procalcitonin.  Monitor pulse ox and hemodynamic status.  Pt is currently clinically stable.      - f/u diagnostic imaging studies to r/o alternate source of infection - CT c/a/p - also demonstrates LLL patchy opacity      LLL pna:    - Cxr and CT chest shows L sided opacity    - Cont broad spectrum abx.  f/u MRSA nasal pcr. (vanco d/c'd).  Pt states intolerance to zosyn (c/o dizziness) - change to cefepime 1gm IV q8.      - Legionella Ag (-), can d/c azithromycin.     * De-escalate to PO levaquin in the next 24 to 48 hrs if pt continues to improve, complete 7 day course.       Transaminitis:    - AST > ALT, possible from ETOH.  f/u ctap.    Trend LFTS.      - Check hepatitis panel, ebv (serology shows prior exposure), cmv      Will follow,    d/w pt at bedside.       Anne Roberson  [phone]
Pt is a 60 y/o Female with pmhx of HTN, DM presents to ER c/o of dizziness, "shaking" and fevers x1week.
Pt is a 60 y/o Female with pmhx of HTN, DM presents to ER c/o of dizziness, "shaking" and fevers x1week.  "im in alcohol withdrawal".  patient reports was on a 5 day briggs where she drank a bottle of red wine nightly, although admits to drinking a bottle of wine most nights of the week.  The day after began feeling shaky, and having fevers, admit she does feel like that sometimes after drinking but able to control "withdrawal" with Tylenol PM.  Reports symptoms persisted so went to [facility] 2 days ago and was diagnosed with UTi- has been taking keflex for symptoms.  Reports daily fevers - Tmax 103 Reports still feeling dizziness and chills - head swinging sensation.  Denies acute visual changes, numbness/tinglng, falls, head trauma, abdominal pain, n/v. (28 Aug 2019 18:47)    ER vitals:  Tm 102.2, P 103, BP 95/56.  WBC 14.1 --> 10.9.  LFTs elevated (AST > ALT).  Blood alcohol neg.  uA (-) nit/LE.  CXR LLL pna.   Pt given dose of vanco/zosyn.  Pt states she was recently treated for UTI, and was having difficulty urinating, requiring her strain.  Pt states she has chronic cough from smoking, has not noticed any worsening, no sputum production, cp, sob, or pleurisy.  Pt was recently visiting her granddaughter in [place], during which time she took her granddaughter to ER for high fevers.      ID consult called for further abx managment.     Sepsis:    - Pt a/w fever, mild tachycardia, leukocytosis. Suspect source is LLL pna as seen on cxr imaging.  Cont broad spectrum abx to treat infectious focus.    - f/u  blood cx, urine cx, UA - NGTD    - Monitor lactate, check procalcitonin.  Monitor pulse ox and hemodynamic status.  Pt is currently clinically stable.      - f/u diagnostic imaging studies to r/o alternate source of infection - CT c/a/p - also demonstrates LLL patchy opacity      LLL pna:    - Cxr and CT chest shows L sided opacity    - Cont broad spectrum abx.  f/u MRSA nasal pcr. (vanco d/c'd).  Pt states intolerance to zosyn (c/o dizziness) - change to cefepime 1gm IV q8.      - Legionella Ag (-), can d/c azithromycin.     * Complete 7 day course (through 9/3) - can d/c on one dose of levquin 500mg today.      Transaminitis:    - AST > ALT, possible from ETOH.  f/u ctap.    Trend LFTS - cont to improve    - Check hepatitis panel (Hep C nonreactive), ebv (serology shows prior exposure), cmv        OK to d/c from ID standpoint      Anne Roberson  [phone]
Pt is a 60 y/o Female with pmhx of HTN, DM presents to ER c/o of dizziness, "shaking" and fevers x1week.  "im in alcohol withdrawal".  patient reports was on a 5 day briggs where she drank a bottle of red wine nightly, although admits to drinking a bottle of wine most nights of the week.  The day after began feeling shaky, and having fevers, admit she does feel like that sometimes after drinking but able to control "withdrawal" with Tylenol PM.  Reports symptoms persisted so went to [facility] 2 days ago and was diagnosed with UTi- has been taking keflex for symptoms.  Reports daily fevers - Tmax 103 Reports still feeling dizziness and chills - head swinging sensation.  Denies acute visual changes, numbness/tinglng, falls, head trauma, abdominal pain, n/v. (28 Aug 2019 18:47)    ER vitals:  Tm 102.2, P 103, BP 95/56.  WBC 14.1 --> 10.9.  LFTs elevated (AST > ALT).  Blood alcohol neg.  uA (-) nit/LE.  CXR LLL pna.   Pt given dose of vanco/zosyn.  Pt states she was recently treated for UTI, and was having difficulty urinating, requiring her strain.  Pt states she has chronic cough from smoking, has not noticed any worsening, no sputum production, cp, sob, or pleurisy.  Pt was recently visiting her granddaughter in [place], during which time she took her granddaughter to ER for high fevers.      ID consult called for further abx managment.     Sepsis:    - Pt a/w fever, mild tachycardia, leukocytosis. Suspect source is LLL pna as seen on cxr imaging.  Cont broad spectrum abx to treat infectious focus.    - f/u  blood cx, urine cx, UA - NGTD    - Monitor lactate, check procalcitonin.  Monitor pulse ox and hemodynamic status.  Pt is currently clinically stable.      - f/u diagnostic imaging studies to r/o alternate source of infection - CT c/a/p - also demonstrates LLL patchy opacity      LLL pna:    - Cxr and CT chest shows L sided opacity    - Cont broad spectrum abx.  f/u MRSA nasal pcr. (vanco d/c'd).  Pt states intolerance to zosyn (c/o dizziness) - change to cefepime 1gm IV q8.      - Legionella Ag (-), can d/c azithromycin.     * De-escalate to PO levaquin in the next 24 to 48 hrs if pt continues to improve, complete 7 day course (through 9/3)      Transaminitis:    - AST > ALT, possible from ETOH.  f/u ctap.    Trend LFTS - cont to improve    - Check hepatitis panel (Hep C nonreactive), ebv (serology shows prior exposure), cmv        d/c planning in       [facility]  [phone]
Pt is a 60 y/o Female with pmhx of HTN, DM presents to ER c/o of dizziness, "shaking" and fevers x1week.
Pt is a 58 y/o Female with pmhx of HTN, DM presents to ER c/o of dizziness, "shaking" and fevers x1week.
Pt is a 60 y/o Female with pmhx of HTN, DM presents to ER c/o of dizziness, "shaking" and fevers x1week.

## 2019-09-03 NOTE — PROGRESS NOTE ADULT - PROBLEM SELECTOR PLAN 6
norvasc 5 and valsartan, can switch to losartan 100   monitor BP and titrate as tolerated
secondary to Hx of ETOH  improved   CT abd/pelv

## 2019-09-03 NOTE — PROGRESS NOTE ADULT - PROBLEM SELECTOR PLAN 5
secondary to Hx of ETOH  monitor LFts for now  CT abd/pelv
secondary to Hx of ETOH  monitor LFts for now  CT abd/pelv
replace all electrolytes  Nephro eval appreciated
secondary to Hx of ETOH  monitor LFts for now  CT abd/pelv

## 2019-09-03 NOTE — DISCHARGE NOTE NURSING/CASE MANAGEMENT/SOCIAL WORK - PATIENT PORTAL LINK FT
You can access the FollowMyHealth Patient Portal offered by [facility] by registering at the following website: [email or web address]. By joining [facility]’s FollowMyHealth portal, you will also be able to view your health information using other applications (apps) compatible with our system.

## 2019-09-28 ENCOUNTER — APPOINTMENT (OUTPATIENT)
Dept: RADIOLOGY | Facility: IMAGING CENTER | Age: 59
End: 2019-09-28
Payer: COMMERCIAL

## 2019-09-28 ENCOUNTER — OUTPATIENT (OUTPATIENT)
Dept: OUTPATIENT SERVICES | Facility: HOSPITAL | Age: 59
LOS: 1 days | End: 2019-09-28
Payer: COMMERCIAL

## 2019-09-28 DIAGNOSIS — Z98.51 TUBAL LIGATION STATUS: Chronic | ICD-10-CM

## 2019-09-28 DIAGNOSIS — Z00.8 ENCOUNTER FOR OTHER GENERAL EXAMINATION: ICD-10-CM

## 2019-09-28 PROCEDURE — 73630 X-RAY EXAM OF FOOT: CPT | Mod: 26,RT

## 2019-09-28 PROCEDURE — 73630 X-RAY EXAM OF FOOT: CPT

## 2020-11-21 NOTE — PATIENT PROFILE ADULT - NSPROMEDSADMININFO_GEN_A_NUR
PAST MEDICAL HISTORY:  BPH (benign prostatic hyperplasia) s/p SPC    DM (diabetes mellitus)     Emphysema/COPD     ESRD (end stage renal disease)     GERD (gastroesophageal reflux disease)     HTN (hypertension)      no concerns
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
## 2021-02-12 ENCOUNTER — EMERGENCY (EMERGENCY)
Facility: HOSPITAL | Age: 61
LOS: 1 days | Discharge: HOME | End: 2021-02-12
Attending: EMERGENCY MEDICINE
Payer: COMMERCIAL

## 2021-02-12 VITALS
HEART RATE: 91 BPM | TEMPERATURE: 99 F | DIASTOLIC BLOOD PRESSURE: 86 MMHG | RESPIRATION RATE: 18 BRPM | SYSTOLIC BLOOD PRESSURE: 121 MMHG | OXYGEN SATURATION: 98 %

## 2021-02-12 VITALS
WEIGHT: 184.09 LBS | HEIGHT: 64 IN | HEART RATE: 102 BPM | OXYGEN SATURATION: 99 % | DIASTOLIC BLOOD PRESSURE: 72 MMHG | RESPIRATION RATE: 18 BRPM | TEMPERATURE: 99 F | SYSTOLIC BLOOD PRESSURE: 107 MMHG

## 2021-02-12 DIAGNOSIS — Z98.51 TUBAL LIGATION STATUS: Chronic | ICD-10-CM

## 2021-02-12 LAB
ALBUMIN SERPL ELPH-MCNC: 3.8 G/DL — SIGNIFICANT CHANGE UP (ref 3.3–5)
ALBUMIN SERPL ELPH-MCNC: 4.4 G/DL — SIGNIFICANT CHANGE UP (ref 3.3–5)
ALP SERPL-CCNC: 78 U/L — SIGNIFICANT CHANGE UP (ref 40–120)
ALP SERPL-CCNC: 83 U/L — SIGNIFICANT CHANGE UP (ref 40–120)
ALT FLD-CCNC: 18 U/L — SIGNIFICANT CHANGE UP (ref 10–45)
ALT FLD-CCNC: 22 U/L — SIGNIFICANT CHANGE UP (ref 10–45)
ANION GAP SERPL CALC-SCNC: 15 MMOL/L — SIGNIFICANT CHANGE UP (ref 5–17)
ANION GAP SERPL CALC-SCNC: 16 MMOL/L — SIGNIFICANT CHANGE UP (ref 5–17)
AST SERPL-CCNC: 28 U/L — SIGNIFICANT CHANGE UP (ref 10–40)
AST SERPL-CCNC: 41 U/L — HIGH (ref 10–40)
BASOPHILS # BLD AUTO: 0.05 K/UL — SIGNIFICANT CHANGE UP (ref 0–0.2)
BASOPHILS NFR BLD AUTO: 0.5 % — SIGNIFICANT CHANGE UP (ref 0–2)
BILIRUB SERPL-MCNC: 0.3 MG/DL — SIGNIFICANT CHANGE UP (ref 0.2–1.2)
BILIRUB SERPL-MCNC: 0.3 MG/DL — SIGNIFICANT CHANGE UP (ref 0.2–1.2)
BUN SERPL-MCNC: 12 MG/DL — SIGNIFICANT CHANGE UP (ref 7–23)
BUN SERPL-MCNC: 13 MG/DL — SIGNIFICANT CHANGE UP (ref 7–23)
CALCIUM SERPL-MCNC: 8.4 MG/DL — SIGNIFICANT CHANGE UP (ref 8.4–10.5)
CALCIUM SERPL-MCNC: 8.9 MG/DL — SIGNIFICANT CHANGE UP (ref 8.4–10.5)
CHLORIDE SERPL-SCNC: 102 MMOL/L — SIGNIFICANT CHANGE UP (ref 96–108)
CHLORIDE SERPL-SCNC: 106 MMOL/L — SIGNIFICANT CHANGE UP (ref 96–108)
CO2 SERPL-SCNC: 20 MMOL/L — LOW (ref 22–31)
CO2 SERPL-SCNC: 20 MMOL/L — LOW (ref 22–31)
CREAT SERPL-MCNC: 0.57 MG/DL — SIGNIFICANT CHANGE UP (ref 0.5–1.3)
CREAT SERPL-MCNC: 0.66 MG/DL — SIGNIFICANT CHANGE UP (ref 0.5–1.3)
EOSINOPHIL # BLD AUTO: 0.15 K/UL — SIGNIFICANT CHANGE UP (ref 0–0.5)
EOSINOPHIL NFR BLD AUTO: 1.4 % — SIGNIFICANT CHANGE UP (ref 0–6)
GAS PNL BLDV: SIGNIFICANT CHANGE UP
GLUCOSE SERPL-MCNC: 102 MG/DL — HIGH (ref 70–99)
GLUCOSE SERPL-MCNC: 89 MG/DL — SIGNIFICANT CHANGE UP (ref 70–99)
HCT VFR BLD CALC: 39 % — SIGNIFICANT CHANGE UP (ref 34.5–45)
HGB BLD-MCNC: 13.3 G/DL — SIGNIFICANT CHANGE UP (ref 11.5–15.5)
IMM GRANULOCYTES NFR BLD AUTO: 0.6 % — SIGNIFICANT CHANGE UP (ref 0–1.5)
LYMPHOCYTES # BLD AUTO: 27.7 % — SIGNIFICANT CHANGE UP (ref 13–44)
LYMPHOCYTES # BLD AUTO: 3.08 K/UL — SIGNIFICANT CHANGE UP (ref 1–3.3)
MCHC RBC-ENTMCNC: 31.1 PG — SIGNIFICANT CHANGE UP (ref 27–34)
MCHC RBC-ENTMCNC: 34.1 GM/DL — SIGNIFICANT CHANGE UP (ref 32–36)
MCV RBC AUTO: 91.1 FL — SIGNIFICANT CHANGE UP (ref 80–100)
MONOCYTES # BLD AUTO: 0.82 K/UL — SIGNIFICANT CHANGE UP (ref 0–0.9)
MONOCYTES NFR BLD AUTO: 7.4 % — SIGNIFICANT CHANGE UP (ref 2–14)
NEUTROPHILS # BLD AUTO: 6.93 K/UL — SIGNIFICANT CHANGE UP (ref 1.8–7.4)
NEUTROPHILS NFR BLD AUTO: 62.4 % — SIGNIFICANT CHANGE UP (ref 43–77)
NRBC # BLD: 0 /100 WBCS — SIGNIFICANT CHANGE UP (ref 0–0)
PLATELET # BLD AUTO: 271 K/UL — SIGNIFICANT CHANGE UP (ref 150–400)
POTASSIUM SERPL-MCNC: 4.8 MMOL/L — SIGNIFICANT CHANGE UP (ref 3.5–5.3)
POTASSIUM SERPL-MCNC: 6.5 MMOL/L — CRITICAL HIGH (ref 3.5–5.3)
POTASSIUM SERPL-SCNC: 4.8 MMOL/L — SIGNIFICANT CHANGE UP (ref 3.5–5.3)
POTASSIUM SERPL-SCNC: 6.5 MMOL/L — CRITICAL HIGH (ref 3.5–5.3)
PROT SERPL-MCNC: 6.6 G/DL — SIGNIFICANT CHANGE UP (ref 6–8.3)
PROT SERPL-MCNC: 8.4 G/DL — HIGH (ref 6–8.3)
RBC # BLD: 4.28 M/UL — SIGNIFICANT CHANGE UP (ref 3.8–5.2)
RBC # FLD: 15.6 % — HIGH (ref 10.3–14.5)
SODIUM SERPL-SCNC: 138 MMOL/L — SIGNIFICANT CHANGE UP (ref 135–145)
SODIUM SERPL-SCNC: 141 MMOL/L — SIGNIFICANT CHANGE UP (ref 135–145)
WBC # BLD: 11.1 K/UL — HIGH (ref 3.8–10.5)
WBC # FLD AUTO: 11.1 K/UL — HIGH (ref 3.8–10.5)

## 2021-02-12 PROCEDURE — 85025 COMPLETE CBC W/AUTO DIFF WBC: CPT

## 2021-02-12 PROCEDURE — 73030 X-RAY EXAM OF SHOULDER: CPT | Mod: 26,RT

## 2021-02-12 PROCEDURE — 99284 EMERGENCY DEPT VISIT MOD MDM: CPT | Mod: 25

## 2021-02-12 PROCEDURE — 73060 X-RAY EXAM OF HUMERUS: CPT

## 2021-02-12 PROCEDURE — 73070 X-RAY EXAM OF ELBOW: CPT

## 2021-02-12 PROCEDURE — 71045 X-RAY EXAM CHEST 1 VIEW: CPT

## 2021-02-12 PROCEDURE — 73060 X-RAY EXAM OF HUMERUS: CPT | Mod: 26,RT

## 2021-02-12 PROCEDURE — 73030 X-RAY EXAM OF SHOULDER: CPT

## 2021-02-12 PROCEDURE — 96374 THER/PROPH/DIAG INJ IV PUSH: CPT

## 2021-02-12 PROCEDURE — 80053 COMPREHEN METABOLIC PANEL: CPT

## 2021-02-12 PROCEDURE — 71045 X-RAY EXAM CHEST 1 VIEW: CPT | Mod: 26

## 2021-02-12 PROCEDURE — 73070 X-RAY EXAM OF ELBOW: CPT | Mod: 26,RT

## 2021-02-12 PROCEDURE — 99284 EMERGENCY DEPT VISIT MOD MDM: CPT

## 2021-02-12 RX ORDER — LIDOCAINE 4 G/100G
1 CREAM TOPICAL ONCE
Refills: 0 | Status: COMPLETED | OUTPATIENT
Start: 2021-02-12 | End: 2021-02-12

## 2021-02-12 RX ORDER — KETOROLAC TROMETHAMINE 30 MG/ML
15 SYRINGE (ML) INJECTION ONCE
Refills: 0 | Status: DISCONTINUED | OUTPATIENT
Start: 2021-02-12 | End: 2021-02-12

## 2021-02-12 RX ORDER — DIAZEPAM 5 MG
5 TABLET ORAL ONCE
Refills: 0 | Status: DISCONTINUED | OUTPATIENT
Start: 2021-02-12 | End: 2021-02-12

## 2021-02-12 RX ADMIN — LIDOCAINE 1 PATCH: 4 CREAM TOPICAL at 10:01

## 2021-02-12 RX ADMIN — Medication 5 MILLIGRAM(S): at 10:01

## 2021-02-12 RX ADMIN — Medication 15 MILLIGRAM(S): at 10:01

## 2021-02-12 NOTE — ED ADULT NURSE REASSESSMENT NOTE - NS ED NURSE REASSESS COMMENT FT1
Pt resting comfortably in stretcher. VSS. Pt states that the spasms in her right shoulder and arm has sup sided. Safety maintained. Will continue to monitor.

## 2021-02-12 NOTE — ED PROVIDER NOTE - PROGRESS NOTE DETAILS
Chris Minor, DO PGY-1: Pt's arm feeling better able to move arm actively, passive ROM improved as well, will send for f/u w/ ortho

## 2021-02-12 NOTE — ED PROVIDER NOTE - NSFOLLOWUPINSTRUCTIONS_ED_ALL_ED_FT
You were seen in the Emergency Department for arm and shoulder pain.    Follow up with an Orthopedic Surgeon within the week. Their information has been provided for your convenience.     You may take 650 mg Tylenol (acetaminophen) every eight hours as needed for pain.    You may take 600mg Ibuprofen (Advil) once every 8 hours as needed for pain. See medication label for warnings and use instructions.    If you have arm pain, arm numbness, arm weakness, or paresthesias, fever, chills, nausea, vomiting, new or worsening pain, or if you have any new symptoms return to the Emergency Department.

## 2021-02-12 NOTE — SBIRT NOTE ADULT - NSSBIRTALCPASSREFTXDET_GEN_A_CORE
LMSW met with patient at bedside and introduced self and role to which patient verbalized understanding. Patient is alert and oriented x4 at this time. Patient states she lives alone and is completely independent. Patient states she drinks about a bottle of wine each evening. She states she does not drink in the mornings only at night to wind down. She states she has not experienced withdrawal symptoms in the past. Patient states she has gone to AA in the past but did not find it helpful. She is interested in one on one outpatient counseling for alcohol use. LMSW provided patient with a number of community resources including telephonic SBIRT line to get connected to an SBIRT counselor. Patient extremely receptive to resources, states she will look over them at home. Patient also provided education and counseling on at-risk alcohol use. Patient also receptive to counseling and education. Patient declines LMSW assistance arranging outpatient appointment. Contact information provided and ongoing social work availability ensured. Social work remains available as needed.

## 2021-02-12 NOTE — ED PROCEDURE NOTE - CPROC ED TIME OUT STATEMENT1
Spoke with mom notifying her of lab results. “Patient's name, , procedure and correct site were confirmed during the [facility] Timeout.”

## 2021-02-12 NOTE — ED PROVIDER NOTE - PHYSICAL EXAMINATION
CONSTITUTIONAL: uncomfortable appearing  SKIN: Warm dry, normal skin turgor, no abrasions no ecchymosis no edema over R arm  HEAD: NCAT  EYES: EOMI, PERRLA  NECK: Supple; non tender. Full ROM. no pain w/ axial compression  CARD: RRR, no murmurs.  RESP: clear to ausculation b/l. No crackles or wheezing.  EXT: R arm passive ROM limited in all directions 2/2 pain, active ROM extremely limited by pain, R arm and shoulder held down to bed on pt's side, held close to body, 4/4 radial pulses, cap refill <2 seconds. Tenderness to palpation of proximal arm, R shoulder  NEURO: Motor str R arm limited due to pain, equal  str bilaterally, no sensory deficits.  PSYCH: Cooperative, appropriate, no signs of intoxication

## 2021-02-12 NOTE — ED PROVIDER NOTE - NS ED ROS FT
Constitutional:  (-) fever, (-) chills, (-) lethargy  Eyes:  (-) eye pain (-) visual changes  ENMT: (-) nasal discharge, (-) sore throat. (-) neck pain or stiffness  Cardiac: (-) chest pain (-) palpitations  Respiratory:  (-) cough (-) respiratory distress.   GI:  (-) nausea (-) vomiting (-) diarrhea (-) abdominal pain.  :  (-) dysuria (-) frequency (-) burning.  MS:  (-) back pain (+) R arm pain  Neuro:  (-) headache (-) numbness (-) tingling (-) focal weakness  Skin:  (-) rash  Except as documented in the HPI,  all other systems are negative

## 2021-02-12 NOTE — ED PROVIDER NOTE - PATIENT PORTAL LINK FT
You can access the FollowMyHealth Patient Portal offered by [facility] by registering at the following website: [email or web address]. By joining [facility]’s FollowMyHealth portal, you will also be able to view your health information using other applications (apps) compatible with our system.

## 2021-02-12 NOTE — ED ADULT NURSE NOTE - OBJECTIVE STATEMENT
60yr old female patient with a PMH of HTN, pre diabetes coming in with Right arm/shoulder pain. Pt states that she was sitting at her desk doing paperwork yesterday when this aching/spasms started in her right shoulder radiating down her arm. Pt states that it continued and then woke her up during the night with sharp spasms. Pt denies any trauma; pain came randomly. Pt denies any n/v/d, SOB, fever, chills or CP at current time. VSS. Pt A%Ox4. +2 palpable radial pulses bilaterally. Cap refill <2 seconds. Pt states that she drinks about a bottle of wine everyday, last drink was around 7pm last night. Pt appears calm and no signs of hallucinations, visual changes or signs of withdrawal. Pt is unable to have full mobility of right arm. Pts arm placed in a sling. Pt to Xray of right arm. Safety maintained. Call bell at bedside. IV toradol, PO valium and lidocaine patch placed on right shoulder for pain. Will continue to monitor.

## 2021-02-12 NOTE — ED PROVIDER NOTE - NSFOLLOWUPCLINICS_GEN_ALL_ED_FT
[facility] [place]  Orthopedics  .  [place]   Phone: [phone]  Fax:   Follow Up Time:     [facility]  Orthopedic Surgery  [address] [place] [id number]  Phone: [phone]  Fax:   Follow Up Time:

## 2021-02-12 NOTE — ED PROVIDER NOTE - CLINICAL SUMMARY MEDICAL DECISION MAKING FREE TEXT BOX
ATTG: : shoulder pain atraumatic concerns include but not limited to msk / neuropath pain,   will check ekg, check finer stick, check labs, check xray shoulder, pain control, re eval for dispo.

## 2021-02-12 NOTE — ED PROVIDER NOTE - ATTENDING CONTRIBUTION TO CARE
61 y/o f with pmhx HTN, DM (not on meds), Alcohol abuse, tob smoker, presents for pain on right arm. She is right hand dominant. started having pain last night shortly after she doing paperwork in bed. no fall / injury. no weakness or numbness. no headache no new dizziness. no fever or chills. first time. Took APAP with some relief last night.   Gen.  no acute distress  HEENT: perrl eomi, neck - no step off or midline tender. full range of motion. neg spurling compression.    Lungs:  b/l bs  CVS: S1S2   Abd;  soft non tender no distention  Ext: no pitting edema, pulses 2+ radial both upper ext. distal sensation intact. limited range of motion of right upper in flex and ext of shoulder secondary to pain. Pain with ABduction both active and passive.   Neuro: aaox3, no focal deficits  MSK: strength 5/5 b/l upper and lower ext, able to squeeze hand / fingers.

## 2021-02-12 NOTE — ED PROVIDER NOTE - OBJECTIVE STATEMENT
60F PMH HTN prediabetes hx EtOH abuse presents to ED for R sided arm pain which started last night while doing paperwork in bed, pt states she twisted to the left and felt a sudden severe 7/10 pain over her proximal R arm into R shoulder, pt took Tylenol last night and went to sleep, upon waking this AM, pain was up to a 10/10 worse w/ movement better w/ shoulder immobilization, no sensory deficits no weakness, no SOB no chest pain no headache no neck pain.

## 2021-02-13 ENCOUNTER — EMERGENCY (EMERGENCY)
Facility: HOSPITAL | Age: 61
LOS: 1 days | Discharge: HOME | End: 2021-02-13
Attending: EMERGENCY MEDICINE
Payer: COMMERCIAL

## 2021-02-13 VITALS
HEIGHT: 64 IN | TEMPERATURE: 98 F | HEART RATE: 100 BPM | OXYGEN SATURATION: 99 % | SYSTOLIC BLOOD PRESSURE: 126 MMHG | RESPIRATION RATE: 20 BRPM | DIASTOLIC BLOOD PRESSURE: 83 MMHG

## 2021-02-13 DIAGNOSIS — Z98.51 TUBAL LIGATION STATUS: Chronic | ICD-10-CM

## 2021-02-13 PROCEDURE — 99284 EMERGENCY DEPT VISIT MOD MDM: CPT

## 2021-02-13 PROCEDURE — 99283 EMERGENCY DEPT VISIT LOW MDM: CPT | Mod: 25

## 2021-02-13 PROCEDURE — 96372 THER/PROPH/DIAG INJ SC/IM: CPT

## 2021-02-13 RX ORDER — LIDOCAINE 4 G/100G
1 CREAM TOPICAL ONCE
Refills: 0 | Status: COMPLETED | OUTPATIENT
Start: 2021-02-13 | End: 2021-02-13

## 2021-02-13 RX ORDER — ACETAMINOPHEN 500 MG
975 TABLET ORAL ONCE
Refills: 0 | Status: COMPLETED | OUTPATIENT
Start: 2021-02-13 | End: 2021-02-13

## 2021-02-13 RX ORDER — DIAZEPAM 5 MG
5 TABLET ORAL ONCE
Refills: 0 | Status: DISCONTINUED | OUTPATIENT
Start: 2021-02-13 | End: 2021-02-13

## 2021-02-13 RX ORDER — DIAZEPAM 5 MG
1 TABLET ORAL
Qty: 9 | Refills: 0
Start: 2021-02-13 | End: 2021-02-15

## 2021-02-13 RX ORDER — KETOROLAC TROMETHAMINE 30 MG/ML
15 SYRINGE (ML) INJECTION ONCE
Refills: 0 | Status: DISCONTINUED | OUTPATIENT
Start: 2021-02-13 | End: 2021-02-13

## 2021-02-13 RX ADMIN — Medication 15 MILLIGRAM(S): at 07:57

## 2021-02-13 RX ADMIN — LIDOCAINE 1 PATCH: 4 CREAM TOPICAL at 07:57

## 2021-02-13 RX ADMIN — Medication 5 MILLIGRAM(S): at 07:57

## 2021-02-13 RX ADMIN — Medication 975 MILLIGRAM(S): at 07:57

## 2021-02-13 NOTE — ED PROVIDER NOTE - ATTENDING CONTRIBUTION TO CARE
Attending MD Aissatou Cyr:  I personally have seen and examined this patient.  Resident note reviewed and agree on plan of care and except where noted.  See HPI, PE, and MDM for details.

## 2021-02-13 NOTE — ED PROVIDER NOTE - PROGRESS NOTE DETAILS
Arron, PGY2 – Pt was re-evaluated at bedside, feeling better overall. Pain improved to 4/10. Comfortable with DC at this time. Return precautions and follow up plan with PCP and/or specialist were discussed. Time was taken to answer any questions that the patient had before providing them with discharge paperwork.

## 2021-02-13 NOTE — ED PROVIDER NOTE - PATIENT PORTAL LINK FT
You can access the FollowMyHealth Patient Portal offered by [facility] by registering at the following website: [email or web address]. By joining [employer]’s FollowMyHealth portal, you will also be able to view your health information using other applications (apps) compatible with our system.

## 2021-02-13 NOTE — ED ADULT NURSE NOTE - NSIMPLEMENTINTERV_GEN_ALL_ED
Implemented All Universal Safety Interventions:  [place] to call system. Call bell, personal items and telephone within reach. Instruct patient to call for assistance. Room bathroom lighting operational. Non-slip footwear when patient is off stretcher. Physically safe environment: no spills, clutter or unnecessary equipment. Stretcher in lowest position, wheels locked, appropriate side rails in place.

## 2021-02-13 NOTE — ED PROVIDER NOTE - CLINICAL SUMMARY MEDICAL DECISION MAKING FREE TEXT BOX
Arron, PGY2 - 61F with atraumatic R shoulder pain radiating to R wrist. Evaluated in ED for same complaint yesterday with negative x-rays. MSK pain, ddx includes rotator cuff tear vs impingement vs tendinitis vs calcific tendinitis. Low suspicion for fx, septic joint. Plan: pain control, outpatient ortho f/u Arron, PGY2 - 61F with atraumatic R shoulder pain radiating to R wrist. Evaluated in ED for same complaint yesterday with negative x-rays. MSK pain, ddx includes rotator cuff tear vs impingement vs tendinitis vs calcific tendinitis. Low suspicion for fx, septic joint. Plan: pain control, outpatient ortho f/u  Attending Aissatou Cyr: 61 y/o female presenting with right shoulder pain. seen in the ed for similar. xrays performed which were negative pt initially felt better but overnight worsening pain. on exam pt with ttp right shoulder. neurovascular intact. afebrile. less likely septic joint. no redness or warmth to shoulder. cap refill intact. suspect likely tendinitis, vs rotator cuff injury. low risk for rhabo do compartment syndrome. will treat pain and re-eval. pain is reproducible with palpaiton

## 2021-02-13 NOTE — ED PROVIDER NOTE - NS ED ROS FT
General: Denies fevers  CV: Denies chest pain  Resp: Denies SOB  GI: Denies abdominal pain, nausea, vomiting  Skin: Denies new rashes  Neuro: Denies headache, numbness, tingling, focal weakness  MSK: R shoulder pain radiating to R wrist. Denies recent trauma

## 2021-02-13 NOTE — ED PROVIDER NOTE - PHYSICAL EXAMINATION
I have reviewed the triage vital signs.  Const: AAOx3, in NAD  Eyes: no conjunctival injection  HENT: NCAT, Neck supple, oral mucosa moist  CV: RRR, +S1, S2  Resp: CTAB, no respiratory distress  GI: Abdomen soft, NTND, no guarding  Extremities: No peripheral edema,  2+ radial and DP pulses  Skin: Warm, well perfused, no rash  MSK: No gross deformities appreciated. RUE without erythema/swelling. TTP generalized R shoulder/humerus, no focal bony tenderness. R shoulder/elbow with limited active ROM 2/2 pain but able to passively range  Neuro: No focal sensory or motor deficits  Psych: Appropriate mood and affect I have reviewed the triage vital signs.  Const: AAOx3, in NAD  Eyes: no conjunctival injection  HENT: NCAT, Neck supple, oral mucosa moist  CV: RRR, +S1, S2  Resp: CTAB, no respiratory distress  GI: Abdomen soft, NTND, no guarding  Extremities: No peripheral edema,  2+ radial and DP pulses  Skin: Warm, well perfused, no rash  MSK: No gross deformities appreciated. RUE without erythema/swelling. TTP generalized R shoulder/humerus, no focal bony tenderness. R shoulder/elbow with limited active ROM 2/2 pain but able to passively range  Neuro: No focal sensory or motor deficits  Psych: Appropriate mood and affect  Attending Aissatou Cyr: Gen: NAD, heent: atrauamtic, eomi, perrla, mmm, op pink, uvula midline, neck; nttp, no nuchal rigidity, chest: nttp, no crepitus, cv: rrr, no murmurs, lungs: ctab, abd: soft, nontender, nondistended, no peritoneal signs, +BS, no guarding, ext: ttp right anterior and posterior shoulder. upper arm compartment soft, strong distal pulses, cap refill intact. limited ROM to right shoulder secondary to pain. , skin: no rash, neuro: awake and alert, following commands, speech clear, sensation and strength intact, no focal deficits

## 2021-02-13 NOTE — ED PROVIDER NOTE - NSFOLLOWUPINSTRUCTIONS_ED_ALL_ED_FT
Follow up with an orthopedic surgeon in 1-2 days.    Take Ibuprofen and Tylenol, as needed for pain, as directed on packaging. Read medication warnings.        Shoulder Pain    WHAT YOU NEED TO KNOW:    Shoulder pain is a common problem that can affect your daily activities. Pain can be caused by a problem within your shoulder, such as soreness of a tendon or bursa. A tendon is a cord of tough tissue that connects your muscles to your bones. The bursa is a fluid-filled sac that acts as a cushion between a bone and a tendon. Shoulder pain may also be caused by pain that spreads to your shoulder from another part of your body.  Shoulder Anatomy         DISCHARGE INSTRUCTIONS:    Return to the emergency department if:   •You have severe pain.      •You cannot move your arm or shoulder.      •You have numbness or tingling in your shoulder or arm.      Contact your healthcare provider if:   •Your pain gets worse or does not go away with treatment.      •You have trouble moving your arm or shoulder.      •You have questions or concerns about your condition or care.      Medicines: You may need any of the following:   •Acetaminophen decreases pain and fever. It is available without a doctor's order. Ask how much to take and how often to take it. Follow directions. Read the labels of all other medicines you are using to see if they also contain acetaminophen, or ask your doctor or pharmacist. Acetaminophen can cause liver damage if not taken correctly. Do not use more than 4 grams (4,000 milligrams) total of acetaminophen in one day.       •NSAIDs, such as ibuprofen, help decrease swelling, pain, and fever. This medicine is available with or without a doctor's order. NSAIDs can cause stomach bleeding or kidney problems in certain people. If you take blood thinner medicine, always ask your healthcare provider if NSAIDs are safe for you. Always read the medicine label and follow directions.      •Take your medicine as directed. Contact your healthcare provider if you think your medicine is not helping or if you have side effects. Tell him of her if you are allergic to any medicine. Keep a list of the medicines, vitamins, and herbs you take. Include the amounts, and when and why you take them. Bring the list or the pill bottles to follow-up visits. Carry your medicine list with you in case of an emergency.      Manage your symptoms:   •Apply ice on your shoulder for 20 to 30 minutes every 2 hours or as directed. Use an ice pack, or put crushed ice in a plastic bag. Cover it with a towel before you apply it to your shoulder. Ice helps prevent tissue damage and decreases swelling and pain.      •Apply heat if ice does not help your symptoms. Apply heat on your shoulder for 20 to 30 minutes every 2 hours for as many days as directed. Heat helps decrease pain and muscle spasms.      •Limit activities as directed. Try to avoid repeated overhead movements.      •Go to physical or occupational therapy as directed. A physical therapist teaches you exercises to help improve movement and strength, and to decrease pain. An occupational therapist teaches you skills to help with your daily activities.       Prevent shoulder pain:   •Maintain a good range of motion in your shoulder. Ask your healthcare provider which exercises you should do on a regular basis after you have healed.       •Stretch and strengthen your shoulder. Use proper technique during exercises and sports.      Follow up with your healthcare provider or orthopedist as directed: Write down your questions so you remember to ask them during your visits. Follow up with an orthopedic surgeon in 1-2 days, or the Sports Medicine clinic on Tuesday.    Take Ibuprofen and Tylenol, as needed for pain, as directed on packaging. Read medication warnings.    A prescription for Valium was sent to the pharmacy. Take as directed on packaging. Read medication warnings.     Shoulder Pain    WHAT YOU NEED TO KNOW:    Shoulder pain is a common problem that can affect your daily activities. Pain can be caused by a problem within your shoulder, such as soreness of a tendon or bursa. A tendon is a cord of tough tissue that connects your muscles to your bones. The bursa is a fluid-filled sac that acts as a cushion between a bone and a tendon. Shoulder pain may also be caused by pain that spreads to your shoulder from another part of your body.  Shoulder Anatomy         DISCHARGE INSTRUCTIONS:    Return to the emergency department if:   •You have severe pain.      •You cannot move your arm or shoulder.      •You have numbness or tingling in your shoulder or arm.      Contact your healthcare provider if:   •Your pain gets worse or does not go away with treatment.      •You have trouble moving your arm or shoulder.      •You have questions or concerns about your condition or care.      Medicines: You may need any of the following:   •Acetaminophen decreases pain and fever. It is available without a doctor's order. Ask how much to take and how often to take it. Follow directions. Read the labels of all other medicines you are using to see if they also contain acetaminophen, or ask your doctor or pharmacist. Acetaminophen can cause liver damage if not taken correctly. Do not use more than 4 grams (4,000 milligrams) total of acetaminophen in one day.       •NSAIDs, such as ibuprofen, help decrease swelling, pain, and fever. This medicine is available with or without a doctor's order. NSAIDs can cause stomach bleeding or kidney problems in certain people. If you take blood thinner medicine, always ask your healthcare provider if NSAIDs are safe for you. Always read the medicine label and follow directions.      •Take your medicine as directed. Contact your healthcare provider if you think your medicine is not helping or if you have side effects. Tell him of her if you are allergic to any medicine. Keep a list of the medicines, vitamins, and herbs you take. Include the amounts, and when and why you take them. Bring the list or the pill bottles to follow-up visits. Carry your medicine list with you in case of an emergency.      Manage your symptoms:   •Apply ice on your shoulder for 20 to 30 minutes every 2 hours or as directed. Use an ice pack, or put crushed ice in a plastic bag. Cover it with a towel before you apply it to your shoulder. Ice helps prevent tissue damage and decreases swelling and pain.      •Apply heat if ice does not help your symptoms. Apply heat on your shoulder for 20 to 30 minutes every 2 hours for as many days as directed. Heat helps decrease pain and muscle spasms.      •Limit activities as directed. Try to avoid repeated overhead movements.      •Go to physical or occupational therapy as directed. A physical therapist teaches you exercises to help improve movement and strength, and to decrease pain. An occupational therapist teaches you skills to help with your daily activities.       Prevent shoulder pain:   •Maintain a good range of motion in your shoulder. Ask your healthcare provider which exercises you should do on a regular basis after you have healed.       •Stretch and strengthen your shoulder. Use proper technique during exercises and sports.      Follow up with your healthcare provider or orthopedist as directed: Write down your questions so you remember to ask them during your visits.

## 2021-02-13 NOTE — ED PROVIDER NOTE - NSFOLLOWUPCLINICS_GEN_ALL_ED_FT
[facility]  Sports Medicine  [address] [place] [address]  Phone: [phone]  Fax:     [facility]  Orthopedic Surgery  [address] [address]  Phone: [phone]  Fax:   Follow Up Time:

## 2021-02-13 NOTE — ED PROVIDER NOTE - OBJECTIVE STATEMENT
60F PMH HTN, prediabetes p/w atraumatic RUE pain x 2 days. Began Thurs night while doing paperwork in bed. Pain described as sharp, constant. Worse with abduction. Starts in R shoulder and radiates down to R wrist. No numbness, tingling, weakness. Was evaluated in ED yesterday for same complaint with negative xrays, was advised to f/u with ortho. Upon leaving ED yesterday, pt states pain improved to 4/10 with Toradol/Valium/Lidocaine patch, had improved ROM, but pain worsened 1hr after getting home. No new injury/trauma. Pt returns to ED for persistent pain, currently rated "15/10."

## 2021-02-26 ENCOUNTER — APPOINTMENT (OUTPATIENT)
Dept: ORTHOPEDIC SURGERY | Facility: CLINIC | Age: 61
End: 2021-02-26
Payer: COMMERCIAL

## 2021-02-26 VITALS
SYSTOLIC BLOOD PRESSURE: 116 MMHG | DIASTOLIC BLOOD PRESSURE: 78 MMHG | BODY MASS INDEX: 29.88 KG/M2 | WEIGHT: 175 LBS | HEIGHT: 64 IN | HEART RATE: 92 BPM

## 2021-02-26 DIAGNOSIS — M75.41 IMPINGEMENT SYNDROME OF RIGHT SHOULDER: ICD-10-CM

## 2021-02-26 DIAGNOSIS — Z60.2 PROBLEMS RELATED TO LIVING ALONE: ICD-10-CM

## 2021-02-26 DIAGNOSIS — Z78.9 OTHER SPECIFIED HEALTH STATUS: ICD-10-CM

## 2021-02-26 DIAGNOSIS — M79.642 PAIN IN LEFT HAND: ICD-10-CM

## 2021-02-26 DIAGNOSIS — Z86.79 PERSONAL HISTORY OF OTHER DISEASES OF THE CIRCULATORY SYSTEM: ICD-10-CM

## 2021-02-26 DIAGNOSIS — Z72.89 OTHER PROBLEMS RELATED TO LIFESTYLE: ICD-10-CM

## 2021-02-26 PROCEDURE — 99204 OFFICE O/P NEW MOD 45 MIN: CPT

## 2021-02-26 PROCEDURE — 99072 ADDL SUPL MATRL&STAF TM PHE: CPT

## 2021-02-26 SDOH — SOCIAL STABILITY - SOCIAL INSECURITY: PROBLEMS RELATED TO LIVING ALONE: Z60.2

## 2021-03-01 NOTE — DISCUSSION/SUMMARY
[de-identified] : Discussed findings of today's exam and possible causes of patient's pain.  Educated patient on their most probable diagnosis of acute right shoulder pain due to subacromial impingement.  Reviewed possible courses of treatment, and we collaboratively decided best course of treatment at this time will include conservative management.  Patient has already been seen by urgent care and the emergency room for this issue, she has been given several medications all with very levels of relief.  Patient is advised that the best medication to take for this is oral NSAIDs, and she was advised on which of her medications already prescribed is an oral NSAID, she may take that regularly for the next 1-2 weeks, then as needed thereafter.  Patient will be started on a course of physical therapy to restore normal range of motion and strength as tolerated.  We discussed the role of a cortisone injection, patient elected to defer at this time.\par Patient was also seen for evaluation of left hand swelling and intermittent pain, she states this started after venipuncture performed in the ER.  Patient is already seeing other providers for this issue, she is already been on a course of oral antibiotics for this issue.  There is minimal to no swelling on today's clinical exam, but patient states it does come and go.  Recommend continued ADLs and other activities as tolerated as there is no focal deficits on orthopedic clinical exam.  This is likely mild skin reaction and/or resultant persisting swelling from local irritation.  Recommend topical heat in the morning if there is any stiffness.  If she has persisting pain may address this with her course of physical therapy as above, or may even consider referral to hand therapy if she has persisting pain or dysfunction.  Follow up as needed.  Patient appreciates and agrees with current plan.\par \par This note was generated using dragon medical dictation software.  A reasonable effort has been made for proofreading its contents, but typos may still remain.  If there are any questions or points of clarification needed please notify my office.\par

## 2021-03-01 NOTE — HISTORY OF PRESENT ILLNESS
[de-identified] : Patient is here for right arm pain that began on 2/12/21. There was no inciting injury. She states that the night before she woke up to go to the restroom and noticed soreness in her shoulder. The next day when working on a computer she experienced spasm in her arm. She only found comfort by holding her arm close to her body and she had to use her other arm to raise it. She went to the ER where xrays were taken that were negative for fracture. She was given Tylenol and told to follow up with orthopedic. She returned the next day as her pain worsened. She was give a Toradol shot and Lidocaine patch. She then developed pain and swelling in her left hand where a venipuncture was performed. She saw 2 providers via telehealth. She was prescribed antibiotics which she has finished. She was instructed to follow up with her PCP. Her PCP told her it might be gout. She was unable to obtain the medication they had prescribed. She went to urgent care where she was told that she may have arthritis and was prescribed Naproxen which she takes as needed. She is taking Tylenol for pain relief. She has noticed improvement in her shoulder. The pain and dysfunction in her hand fluctuates. Prior to the 12th she did not have any pain. She works from home. She walks for exercise. There have been no lifestyle changes. Her only health issue is HTN. \par \par The patient's past medical history, past surgical history, medications and allergies were reviewed by me today and documented accordingly. In addition, the patient's family and social history, which were noncontributory to this visit, were reviewed also. Intake form was reviewed. The patient has no family history of arthritis.

## 2021-03-01 NOTE — PHYSICAL EXAM
[de-identified] : Constitutional: Well-nourished, well-developed, No acute distress\par Respiratory:  Good respiratory effort, no SOB\par Lymphatic: No regional lymphadenopathy, no lymphedema\par Psychiatric: Pleasant and normal affect, alert and oriented x3\par Skin: Clean dry and intact B/L UE/LE\par Musculoskeletal: normal except where as noted in regional exam\par \par \par Right Shoulder:\par APPEARANCE: no marked deformities, no swelling or malalignment\par POSITIVE TENDERNESS: supraspinatus, long head biceps tendon\par NONTENDER:  infraspinatus, teres minor. biceps. anterior and posterior capsule. AC joint. \par ROM: full with mild painful arc past 60 degrees, no scapular winging or dyskinesia present\par RESISTIVE TESTING: MMT 4+/5 ER, Flexion and Empty can, 5/5 IR. painless 5/5 resisted ext, horizontal abd/add \par SPECIAL TESTS: + No and Neers, mildly + cross arm adduction, + Speeds, neg Juan's, neg Drop Arm, neg Apprehension. neg apley's scratch test\par \par Left hand:\par APPEARANCE:  no swelling, no marked deformities or malalignment of the fingers\par POSITIVE TENDERNESS: none\par NONTENDER: all other osseous and ligamentous structures. \par ROM: full & painless in CMC, MCP, and IP joints although mild pain and stiffness with full clenched fist. \par RESISTIVE TESTING: painless resisted testing. \par  [de-identified] : I reviewed, interpreted and clinically correlated the following outside imaging studies,\par X-rays, 2 views of the right elbow, no acute fracture or significant degenerative changes seen.\par X-rays, 2 views of the right humerus, no acute fracture or significant degenerative changes seen.\par X-rays, 3 views of the right shoulder, no acute fracture or significant degenerative changes, no calcific deposits.\par \par  EXAM: ELBOW 2VIEWS RT\par \par EXAM: HUMERUS RIGHT\par \par EXAM: SHOULDER RIGHT (MINIMUM 2 VIEWS)\par \par \par PROCEDURE DATE: 02/12/2021\par \par \par \par \par INTERPRETATION: XR SHOULDER 3 VIEWS RIGHT, XR HUMERUS RIGHT, XR ELBOW 2 VIEWS RIGHT\par \par HISTORY: Right shoulder, arm, and elbow pain.\par \par VIEWS: 3 views of the right shoulder with 2 views of the right humerus and 2 views of the right elbow IMAGES: 7\par \par COMPARISON: None.\par \par FINDINGS:\par \par OSSEOUS STRUCTURES\par  Fractures: None.\par  Morphology: Greater tuberosity enthesophytes are noted.\par \par RIGHT SHOULDER JOINTS\par  Joint Space(s): Maintained.\par \par RIGHT ELBOW JOINTS\par  Joint Space(s): Maintained.\par  Effusion: There is no displacement of the elbow fat pads.\par \par VISUALIZED LUNGS: Clear.\par \par VISUALIZED MEDIASTINUM: Unremarkable.\par \par IMPRESSION:\par 1. No acute osseous abnormalities.\par \par

## 2021-05-05 ENCOUNTER — EMERGENCY (EMERGENCY)
Facility: HOSPITAL | Age: 61
LOS: 1 days | Discharge: HOME | End: 2021-05-05
Attending: EMERGENCY MEDICINE | Admitting: EMERGENCY MEDICINE
Payer: COMMERCIAL

## 2021-05-05 VITALS
RESPIRATION RATE: 20 BRPM | TEMPERATURE: 98 F | DIASTOLIC BLOOD PRESSURE: 72 MMHG | SYSTOLIC BLOOD PRESSURE: 112 MMHG | HEART RATE: 86 BPM | OXYGEN SATURATION: 99 %

## 2021-05-05 VITALS
RESPIRATION RATE: 17 BRPM | HEIGHT: 64 IN | DIASTOLIC BLOOD PRESSURE: 77 MMHG | SYSTOLIC BLOOD PRESSURE: 114 MMHG | HEART RATE: 99 BPM | OXYGEN SATURATION: 100 % | TEMPERATURE: 98 F

## 2021-05-05 DIAGNOSIS — Z98.51 TUBAL LIGATION STATUS: Chronic | ICD-10-CM

## 2021-05-05 LAB
ALBUMIN SERPL ELPH-MCNC: 4.5 G/DL — SIGNIFICANT CHANGE UP (ref 3.3–5)
ALP SERPL-CCNC: 98 U/L — SIGNIFICANT CHANGE UP (ref 40–120)
ALT FLD-CCNC: 15 U/L — SIGNIFICANT CHANGE UP (ref 4–33)
ANION GAP SERPL CALC-SCNC: 14 MMOL/L — SIGNIFICANT CHANGE UP (ref 7–14)
AST SERPL-CCNC: 23 U/L — SIGNIFICANT CHANGE UP (ref 4–32)
BASOPHILS # BLD AUTO: 0.05 K/UL — SIGNIFICANT CHANGE UP (ref 0–0.2)
BASOPHILS NFR BLD AUTO: 0.5 % — SIGNIFICANT CHANGE UP (ref 0–2)
BILIRUB SERPL-MCNC: 0.4 MG/DL — SIGNIFICANT CHANGE UP (ref 0.2–1.2)
BUN SERPL-MCNC: 8 MG/DL — SIGNIFICANT CHANGE UP (ref 7–23)
CALCIUM SERPL-MCNC: 9.2 MG/DL — SIGNIFICANT CHANGE UP (ref 8.4–10.5)
CHLORIDE SERPL-SCNC: 101 MMOL/L — SIGNIFICANT CHANGE UP (ref 98–107)
CO2 SERPL-SCNC: 23 MMOL/L — SIGNIFICANT CHANGE UP (ref 22–31)
CREAT SERPL-MCNC: 0.71 MG/DL — SIGNIFICANT CHANGE UP (ref 0.5–1.3)
EOSINOPHIL # BLD AUTO: 0.13 K/UL — SIGNIFICANT CHANGE UP (ref 0–0.5)
EOSINOPHIL NFR BLD AUTO: 1.3 % — SIGNIFICANT CHANGE UP (ref 0–6)
GLUCOSE SERPL-MCNC: 127 MG/DL — HIGH (ref 70–99)
HCT VFR BLD CALC: 36.3 % — SIGNIFICANT CHANGE UP (ref 34.5–45)
HGB BLD-MCNC: 12.3 G/DL — SIGNIFICANT CHANGE UP (ref 11.5–15.5)
IANC: 6.83 K/UL — SIGNIFICANT CHANGE UP (ref 1.5–8.5)
IMM GRANULOCYTES NFR BLD AUTO: 0.3 % — SIGNIFICANT CHANGE UP (ref 0–1.5)
LYMPHOCYTES # BLD AUTO: 2.66 K/UL — SIGNIFICANT CHANGE UP (ref 1–3.3)
LYMPHOCYTES # BLD AUTO: 25.6 % — SIGNIFICANT CHANGE UP (ref 13–44)
MAGNESIUM SERPL-MCNC: 2 MG/DL — SIGNIFICANT CHANGE UP (ref 1.6–2.6)
MCHC RBC-ENTMCNC: 31.4 PG — SIGNIFICANT CHANGE UP (ref 27–34)
MCHC RBC-ENTMCNC: 33.9 GM/DL — SIGNIFICANT CHANGE UP (ref 32–36)
MCV RBC AUTO: 92.6 FL — SIGNIFICANT CHANGE UP (ref 80–100)
MONOCYTES # BLD AUTO: 0.69 K/UL — SIGNIFICANT CHANGE UP (ref 0–0.9)
MONOCYTES NFR BLD AUTO: 6.6 % — SIGNIFICANT CHANGE UP (ref 2–14)
NEUTROPHILS # BLD AUTO: 6.83 K/UL — SIGNIFICANT CHANGE UP (ref 1.8–7.4)
NEUTROPHILS NFR BLD AUTO: 65.7 % — SIGNIFICANT CHANGE UP (ref 43–77)
NRBC # BLD: 0 /100 WBCS — SIGNIFICANT CHANGE UP
NRBC # FLD: 0.04 K/UL — HIGH
PHOSPHATE SERPL-MCNC: 2.2 MG/DL — LOW (ref 2.5–4.5)
PLATELET # BLD AUTO: 318 K/UL — SIGNIFICANT CHANGE UP (ref 150–400)
POTASSIUM SERPL-MCNC: 3.5 MMOL/L — SIGNIFICANT CHANGE UP (ref 3.5–5.3)
POTASSIUM SERPL-SCNC: 3.5 MMOL/L — SIGNIFICANT CHANGE UP (ref 3.5–5.3)
PROT SERPL-MCNC: 7.8 G/DL — SIGNIFICANT CHANGE UP (ref 6–8.3)
RBC # BLD: 3.92 M/UL — SIGNIFICANT CHANGE UP (ref 3.8–5.2)
RBC # FLD: 18.3 % — HIGH (ref 10.3–14.5)
SARS-COV-2 RNA SPEC QL NAA+PROBE: SIGNIFICANT CHANGE UP
SODIUM SERPL-SCNC: 138 MMOL/L — SIGNIFICANT CHANGE UP (ref 135–145)
WBC # BLD: 10.39 K/UL — SIGNIFICANT CHANGE UP (ref 3.8–10.5)
WBC # FLD AUTO: 10.39 K/UL — SIGNIFICANT CHANGE UP (ref 3.8–10.5)

## 2021-05-05 PROCEDURE — 93010 ELECTROCARDIOGRAM REPORT: CPT

## 2021-05-05 PROCEDURE — 99284 EMERGENCY DEPT VISIT MOD MDM: CPT | Mod: 25

## 2021-05-05 PROCEDURE — 71046 X-RAY EXAM CHEST 2 VIEWS: CPT | Mod: 26

## 2021-05-05 RX ORDER — SODIUM CHLORIDE 9 MG/ML
1000 INJECTION, SOLUTION INTRAVENOUS ONCE
Refills: 0 | Status: COMPLETED | OUTPATIENT
Start: 2021-05-05 | End: 2021-05-05

## 2021-05-05 RX ORDER — SODIUM CHLORIDE 9 MG/ML
1000 INJECTION INTRAMUSCULAR; INTRAVENOUS; SUBCUTANEOUS ONCE
Refills: 0 | Status: COMPLETED | OUTPATIENT
Start: 2021-05-05 | End: 2021-05-05

## 2021-05-05 RX ADMIN — SODIUM CHLORIDE 1000 MILLILITER(S): 9 INJECTION INTRAMUSCULAR; INTRAVENOUS; SUBCUTANEOUS at 10:20

## 2021-05-05 RX ADMIN — SODIUM CHLORIDE 1000 MILLILITER(S): 9 INJECTION INTRAMUSCULAR; INTRAVENOUS; SUBCUTANEOUS at 11:20

## 2021-05-05 RX ADMIN — SODIUM CHLORIDE 1000 MILLILITER(S): 9 INJECTION, SOLUTION INTRAVENOUS at 12:23

## 2021-05-05 NOTE — ED PROVIDER NOTE - CLINICAL SUMMARY MEDICAL DECISION MAKING FREE TEXT BOX
This is a 60y Female with PMHx of HTN, smoker presents to the ER for multiple medical complaints. +Lightheadedness, dizziness, diarrhea, HATHAWAY. Likely dehydration secondary to viral illness. Will get labs, hydrate, ekg, cxr, reassess. Dispo pending results.

## 2021-05-05 NOTE — ED PROVIDER NOTE - NS ED ROS FT
Constitutional: (-) Fever, (-) Chills, (-) Anorexia  Skin: (-) Color changes, (-) Rashes, (-) Wounds  Eyes: (-) Visual changes, (-) Discharge, (-) Redness  CV: (-) Chest pain, (-) Palpitations, (-) Diaphoresis, (-) Extremity Swelling, (-) Syncope  Resp: (+) Dyspnea on Exertion, (-) Cough, (-) Shortness of breath, (-) Wheezing  GI: (+) Diarrhea, (+) Nausea, (-) Abdominal pain, (-) Vomiting,(-) Constipation, (-) Melena  : (-) Dysuria, (-) Hematuria, (-) Increased frequency  MSK: (-) Weakness, (-) Myalgias, (-) Back pain, (-) Calf pain, (-) Neck pain  Neuro: (-) Loss of consciousness, (-) Headache, (-) Seizure, (-) Confusion

## 2021-05-05 NOTE — ED PROVIDER NOTE - PROGRESS NOTE DETAILS
Luiz AZEVEDO: Patient feeling much better. Tolerated water, eating lunch now. If able to tolerate PO, patient is ready for discharge. No questions at this time. Vital signs stable. Patient in no acute distress. Luiz AZEVEDO: Patient reports that she is feeling much better after receiving fluids. Second liter running now. All results that are reported at this time were discussed. Patient states she went to the bathroom and did not feel dizzy. Vital signs remain stable. Will PO challenge prior to discharge. Arron, PGY2 – Received pt as sign-out. Pt was re-evaluated at bedside, VSS, feeling better overall. Tolerated PO. Results were discussed with patient as well as return precautions and follow up plan with PCP and/or specialist. Time was taken to answer any questions that the patient had before providing them with discharge paperwork.

## 2021-05-05 NOTE — ED PROVIDER NOTE - OBJECTIVE STATEMENT
This is a 60y Female with PMHx of HTN, smoker presents to the ER for multiple medical complaints. Patient reports multiple episodes of diarrhea over the past 3 days, with intermittent diarrhea a few days before that associated with occasional nausea and lightheadedness. Patient is concerned that she is dehydrated. States that she does not feel like she is spinning or the room is spinning. Had vertigo once before and states this is not like that at all. Patient reports shortness of breath with exertion since last Saturday. Denies fever, chills, chest pain, difficulty breathing, vomiting, abdominal pain or urinary complaints.

## 2021-05-05 NOTE — ED PROVIDER NOTE - PATIENT PORTAL LINK FT
You can access the FollowMyHealth Patient Portal offered by [facility] by registering at the following website: [email or web address]. By joining [facility]’s FollowMyHealth portal, you will also be able to view your health information using other applications (apps) compatible with our system.

## 2021-05-05 NOTE — ED PROVIDER NOTE - ATTENDING CONTRIBUTION TO CARE
DR. VELA, ATTENDING MD-  I performed a face to face bedside interview with the patient regarding history of present illness, review of symptoms and past medical history. I completed an independent physical exam.  I have discussed the patient's plan of care with the PA.   Documentation as above in the note.    61 y/o female h/o htn here with fatigue, nausea, diarrhea, lightheadedness x1 wk.  Pt clinically dry, likely viral gastro, eval for lyte abn, cesilia.  Obtain cbc cmp give ivf bolus, pt declines antiemetic at this time, will need to pass po trial prior to dc.

## 2021-05-05 NOTE — ED ADULT NURSE NOTE - OBJECTIVE STATEMENT
pt received to room 14, a&ox 4, ambulatory, pmh of HTN, complaint with meds, p/w dizziness, diarrhea, decreased appetite/POintake, and "lung pressure" x3days. Pt reports dyspnea on exertion and "wheezing when laying flat." Pt reports "coughing fit" two days ago with yellow productive sputum. denies cough, fever, chills at this time. No swelling noted, pt denies swelling of feet, ankles. Pt denies N/V, endorses decreased PO intake, decrease appetite and reports efforts to hydrate PO. Pt reports dizziness upon standing that self resolves. Pt breathing even and unlabored on room air. NSR on cardiac monitor. Denies chest pain, palpitations. Right 20G IV placed. Labs collected and sent. EKG in chart. Xray completed. pending results, dispo.

## 2021-05-05 NOTE — ED PROVIDER NOTE - NSFOLLOWUPINSTRUCTIONS_ED_ALL_ED_FT
Drink plenty of fluids.     Diarrhea    Diarrhea is frequent loose or watery bowel movements that has many causes. Diarrhea can make you feel weak and cause you to become dehydrated. Diarrhea typically lasts 2–3 days, but can last longer if it is a sign of something more serious. Drink clear fluids to prevent dehydration. Eat bland, easy-to-digest foods as tolerated.     SEEK IMMEDIATE MEDICAL CARE IF YOU HAVE ANY OF THE FOLLOWING SYMPTOMS: high fevers, lightheadedness/dizziness, chest pain, black or bloody stools, shortness of breath, severe abdominal or back pain, or any signs of dehydration.    Advance activity as tolerated.     Continue all previously prescribed medications as directed unless otherwise instructed.     Follow up with your primary care physician in 48-72 hours- bring copies of your results.      If you have issues obtaining follow up, please call: [phone] to obtain a doctor or specialist who takes your insurance in your area.  You may call [phone] to make an appointment with the internal medicine clinic. Follow up with your primary care doctor within 48 hours.     Drink plenty of fluids.     Diarrhea    Diarrhea is frequent loose or watery bowel movements that has many causes. Diarrhea can make you feel weak and cause you to become dehydrated. Diarrhea typically lasts 2–3 days, but can last longer if it is a sign of something more serious. Drink clear fluids to prevent dehydration. Eat bland, easy-to-digest foods as tolerated.     SEEK IMMEDIATE MEDICAL CARE IF YOU HAVE ANY OF THE FOLLOWING SYMPTOMS: high fevers, lightheadedness/dizziness, chest pain, black or bloody stools, shortness of breath, severe abdominal or back pain, or any signs of dehydration.    Advance activity as tolerated.     Continue all previously prescribed medications as directed unless otherwise instructed.     Follow up with your primary care physician in 48-72 hours- bring copies of your results.      If you have issues obtaining follow up, please call: [phone] to obtain a doctor or specialist who takes your insurance in your area.  You may call [phone] to make an appointment with the internal medicine clinic.

## 2021-05-05 NOTE — ED ADULT TRIAGE NOTE - CHIEF COMPLAINT QUOTE
Pt c/o dizziness, weakness, diarrhea, and "lung pressure" Pt states she needs to sleep upright due to pain and trouble breathing. Pt breathing even and unlabored in triage. Pt states symptoms have been intermittent over the past week. Unable to tolerate PO due to nausea and diarrhea. PMH HTN.

## 2021-05-05 NOTE — ED ADULT NURSE NOTE - BREATHING, MLM
After Visit Summary   10/11/2018    Harvey Guevara    MRN: [id number]           Patient Information     Date Of Birth          1951        Visit Information        Provider Department      10/11/2018 7:20 AM José Lee MD [facility]        Today's Diagnoses Medicare annual wellness visit, subsequent    -  1    Screening for AAA (abdominal aortic aneurysm)        Need for prophylactic vaccination and inoculation against influenza        Hyperlipidemia with target LDL less than 130        History of tobacco use        Hypertension goal BP (blood pressure) < 150/90          Care Instructions      Preventive Health Recommendations:       Male Ages 65 and over    Yearly exam:             See your health care provider every year in order to  o   Review health changes.   o   Discuss preventive care.    o   Review your medicines if your doctor has prescribed any.    Talk with your health care provider about whether you should have a test to screen for prostate cancer (PSA).    Every 3 years, have a diabetes test (fasting glucose). If you are at risk for diabetes, you should have this test more often.    Every 5 years, have a cholesterol test. Have this test more often if you are at risk for high cholesterol or heart disease.     Every 10 years, have a colonoscopy. Or, have a yearly FIT test (stool test). These exams will check for colon cancer.    Talk to with your health care provider about screening for Abdominal Aortic Aneurysm if you have a family history of AAA or have a history of smoking.  Shots:     Get a flu shot each year.     Get a tetanus shot every 10 years.     Talk to your doctor about your pneumonia vaccines. There are now two you should receive - Pneumovax (PPSV 23) and Prevnar (PCV 13).    Talk to your pharmacist about a shingles vaccine.     Talk to your doctor about the hepatitis B vaccine.  Nutrition:     Eat at least 5 servings of fruits and vegetables  "each day.     Eat whole-grain bread, whole-wheat pasta and brown rice instead of white grains and rice.     Get adequate Calcium and Vitamin D.   Lifestyle    Exercise for at least 150 minutes a week (30 minutes a day, 5 days a week). This will help you control your weight and prevent disease.     Limit alcohol to one drink per day.     No smoking.     Wear sunscreen to prevent skin cancer.     See your dentist every six months for an exam and cleaning.     See your eye doctor every 1 to 2 years to screen for conditions such as glaucoma, macular degeneration and cataracts.          Follow-ups after your visit        Your next 10 appointments already scheduled     Nov 15, 2018  7:40 AM CST   [facility]    [address] [address] [phone]              Who to contact     If you have questions or need follow up information about today's clinic visit or your schedule please contact [facility] directly at [phone].  Normal or non-critical lab and imaging results will be communicated to you by MyChart, letter or phone within 4 business days after the clinic has received the results. If you do not hear from us within 7 days, please contact the clinic through MyChart or phone. If you have a critical or abnormal lab result, we will notify you by phone as soon as possible.  Submit refill requests through [employer] or call your pharmacy and they will forward the refill request to us. Please allow 3 business days for your refill to be completed.          Additional Information About Your Visit        Care EveryWhere ID     This is your Care EveryWhere ID. This could be used by other organizations to access your [facility] medical records  [id number]        Your Vitals Were     Pulse Temperature Respirations Height Pulse Oximetry BMI (Body Mass Index)    73 97.1  F (36.2  C) (Oral) 20 5' 10.5\" (1.791 m) 97% 28.72 " kg/m2       Blood Pressure from Last 3 Encounters:   10/11/18 143/73   07/16/18 130/78   09/21/17 138/78    Weight from Last 3 Encounters:   10/11/18 203 lb (92.1 kg)   07/16/18 195 lb (88.5 kg)   09/21/17 193 lb (87.5 kg)              We Performed the Following     Abdominal Aortic Aneurysm Screening/Tracking     ADMIN INFLUENZA (For MEDICARE Patients ONLY) []     FLU VACCINE, INCREASED ANTIGEN, PRESV FREE, AGE 65+ [94730]          Where to get your medicines      These medications were sent to [facility] [address], [address]  [address], [address], [facility] [address]     Phone:  [phone]     lisinopril 20 MG tablet    simvastatin 40 MG tablet          Primary Care Provider Office Phone # Fax #    José Fritz Lee [phone] [address] [address] [address]        Equal Access to Services     DESI RUSSO : Hadii aad ku hadasho Soomaali, waaxda luqadaha, qaybta kaalmada adeegyada, waxay idiin hayaan coneg bertram jenkins . So [facility] [phone].    ATENCIÓN: Si habla español, tiene a floyd disposición servicios gratuitos de asistencia lingüística. Ll[facility] [phone].    We comply with applicable federal civil rights laws and [place] laws. We do not discriminate on the basis of race, color, national origin, age, disability, sex, sexual orientation, or gender identity.            Thank you!     Thank you for choosing [facility]  for your care. Our goal is always to provide you with excellent care. Hearing back from our patients is one way we can continue to improve our services. Please take a few minutes to complete the written survey that you may receive in the mail after your visit with us. Thank you!             Your Updated Medication List - Protect others around you: Learn how to safely use, store and throw away your medicines at www.disposemymeds.org.          This list is accurate as of 10/11/18  7:57 AM.  Always  use your most recent med list.                   Brand Name Dispense Instructions for use Diagnosis    aspirin 81 MG tablet      Take 81 mg by mouth daily        CENTRUM SILVER per tablet      Take 1 tablet by mouth daily        fluticasone 50 MCG/ACT spray    FLONASE    1 Package    Spray 2 sprays into both nostrils daily as needed for rhinitis or allergies    Sinus congestion       lisinopril 20 MG tablet    PRINIVIL/ZESTRIL    90 tablet    Take 1 tablet (20 mg) by mouth daily For blood pressure Pharmacy ok to hold prescription until due    Hypertension goal BP (blood pressure) < 150/90       sildenafil 20 MG tablet    REVATIO    30 tablet    2-4 pills daily as needed for ED Never use with nitroglycerin, terazosin or doxazosin.    Other male erectile dysfunction       simvastatin 40 MG tablet    ZOCOR    90 tablet    Take 1 tablet (40 mg) by mouth At Bedtime For cholesterol Pharmacy ok to hold prescription until due    Hyperlipidemia with target LDL less than 130       triamcinolone 0.5 % cream    KENALOG    90 g    Apply sparingly to affected area twice daily to leg rash up to 10 days as needed    Psoriasis       valACYclovir 1000 mg tablet    VALTREX    24 tablet    TAKE TWO TABLETS BY MOUTH TWO TIMES A DAY FOR 1 DAY AS NEEDED FOR COLD SORE    Recurrent cold sores          Spontaneous, unlabored and symmetrical

## 2021-05-10 ENCOUNTER — EMERGENCY (EMERGENCY)
Facility: HOSPITAL | Age: 61
LOS: 1 days | Discharge: HOME | End: 2021-05-10
Attending: EMERGENCY MEDICINE | Admitting: EMERGENCY MEDICINE
Payer: COMMERCIAL

## 2021-05-10 ENCOUNTER — APPOINTMENT (OUTPATIENT)
Dept: GASTROENTEROLOGY | Facility: CLINIC | Age: 61
End: 2021-05-10

## 2021-05-10 VITALS
DIASTOLIC BLOOD PRESSURE: 60 MMHG | RESPIRATION RATE: 16 BRPM | TEMPERATURE: 98 F | HEART RATE: 58 BPM | HEIGHT: 64 IN | SYSTOLIC BLOOD PRESSURE: 114 MMHG | OXYGEN SATURATION: 58 %

## 2021-05-10 DIAGNOSIS — Z98.51 TUBAL LIGATION STATUS: Chronic | ICD-10-CM

## 2021-05-10 LAB
ALBUMIN SERPL ELPH-MCNC: 4.3 G/DL — SIGNIFICANT CHANGE UP (ref 3.3–5)
ALP SERPL-CCNC: 81 U/L — SIGNIFICANT CHANGE UP (ref 40–120)
ALT FLD-CCNC: 27 U/L — SIGNIFICANT CHANGE UP (ref 4–33)
ANION GAP SERPL CALC-SCNC: 13 MMOL/L — SIGNIFICANT CHANGE UP (ref 7–14)
APPEARANCE UR: ABNORMAL
AST SERPL-CCNC: 36 U/L — HIGH (ref 4–32)
BASOPHILS # BLD AUTO: 0.03 K/UL — SIGNIFICANT CHANGE UP (ref 0–0.2)
BASOPHILS NFR BLD AUTO: 0.4 % — SIGNIFICANT CHANGE UP (ref 0–2)
BILIRUB SERPL-MCNC: 0.3 MG/DL — SIGNIFICANT CHANGE UP (ref 0.2–1.2)
BILIRUB UR-MCNC: NEGATIVE — SIGNIFICANT CHANGE UP
BUN SERPL-MCNC: 8 MG/DL — SIGNIFICANT CHANGE UP (ref 7–23)
CALCIUM SERPL-MCNC: 9.3 MG/DL — SIGNIFICANT CHANGE UP (ref 8.4–10.5)
CHLORIDE SERPL-SCNC: 104 MMOL/L — SIGNIFICANT CHANGE UP (ref 98–107)
CO2 SERPL-SCNC: 23 MMOL/L — SIGNIFICANT CHANGE UP (ref 22–31)
COLOR SPEC: YELLOW — SIGNIFICANT CHANGE UP
CREAT SERPL-MCNC: 0.68 MG/DL — SIGNIFICANT CHANGE UP (ref 0.5–1.3)
DIFF PNL FLD: ABNORMAL
EOSINOPHIL # BLD AUTO: 0.13 K/UL — SIGNIFICANT CHANGE UP (ref 0–0.5)
EOSINOPHIL NFR BLD AUTO: 1.5 % — SIGNIFICANT CHANGE UP (ref 0–6)
GLUCOSE SERPL-MCNC: 100 MG/DL — HIGH (ref 70–99)
GLUCOSE UR QL: NEGATIVE — SIGNIFICANT CHANGE UP
HCT VFR BLD CALC: 34 % — LOW (ref 34.5–45)
HGB BLD-MCNC: 11.5 G/DL — SIGNIFICANT CHANGE UP (ref 11.5–15.5)
IANC: 4.81 K/UL — SIGNIFICANT CHANGE UP (ref 1.5–8.5)
IMM GRANULOCYTES NFR BLD AUTO: 0.4 % — SIGNIFICANT CHANGE UP (ref 0–1.5)
KETONES UR-MCNC: NEGATIVE — SIGNIFICANT CHANGE UP
LEUKOCYTE ESTERASE UR-ACNC: NEGATIVE — SIGNIFICANT CHANGE UP
LYMPHOCYTES # BLD AUTO: 2.91 K/UL — SIGNIFICANT CHANGE UP (ref 1–3.3)
LYMPHOCYTES # BLD AUTO: 34.4 % — SIGNIFICANT CHANGE UP (ref 13–44)
MCHC RBC-ENTMCNC: 31.3 PG — SIGNIFICANT CHANGE UP (ref 27–34)
MCHC RBC-ENTMCNC: 33.8 GM/DL — SIGNIFICANT CHANGE UP (ref 32–36)
MCV RBC AUTO: 92.4 FL — SIGNIFICANT CHANGE UP (ref 80–100)
MONOCYTES # BLD AUTO: 0.54 K/UL — SIGNIFICANT CHANGE UP (ref 0–0.9)
MONOCYTES NFR BLD AUTO: 6.4 % — SIGNIFICANT CHANGE UP (ref 2–14)
NEUTROPHILS # BLD AUTO: 4.81 K/UL — SIGNIFICANT CHANGE UP (ref 1.8–7.4)
NEUTROPHILS NFR BLD AUTO: 56.9 % — SIGNIFICANT CHANGE UP (ref 43–77)
NITRITE UR-MCNC: NEGATIVE — SIGNIFICANT CHANGE UP
NRBC # BLD: 0 /100 WBCS — SIGNIFICANT CHANGE UP
NRBC # FLD: 0 K/UL — SIGNIFICANT CHANGE UP
PH UR: 6 — SIGNIFICANT CHANGE UP (ref 5–8)
PLATELET # BLD AUTO: 295 K/UL — SIGNIFICANT CHANGE UP (ref 150–400)
POTASSIUM SERPL-MCNC: 3.4 MMOL/L — LOW (ref 3.5–5.3)
POTASSIUM SERPL-SCNC: 3.4 MMOL/L — LOW (ref 3.5–5.3)
PROT SERPL-MCNC: 7.5 G/DL — SIGNIFICANT CHANGE UP (ref 6–8.3)
PROT UR-MCNC: ABNORMAL
RBC # BLD: 3.68 M/UL — LOW (ref 3.8–5.2)
RBC # FLD: 18 % — HIGH (ref 10.3–14.5)
SARS-COV-2 RNA SPEC QL NAA+PROBE: SIGNIFICANT CHANGE UP
SODIUM SERPL-SCNC: 140 MMOL/L — SIGNIFICANT CHANGE UP (ref 135–145)
SP GR SPEC: 1.02 — SIGNIFICANT CHANGE UP (ref 1.01–1.02)
UROBILINOGEN FLD QL: SIGNIFICANT CHANGE UP
WBC # BLD: 8.45 K/UL — SIGNIFICANT CHANGE UP (ref 3.8–10.5)
WBC # FLD AUTO: 8.45 K/UL — SIGNIFICANT CHANGE UP (ref 3.8–10.5)

## 2021-05-10 PROCEDURE — 93010 ELECTROCARDIOGRAM REPORT: CPT

## 2021-05-10 PROCEDURE — 99218: CPT | Mod: 25

## 2021-05-10 RX ORDER — MECLIZINE HCL 12.5 MG
25 TABLET ORAL ONCE
Refills: 0 | Status: COMPLETED | OUTPATIENT
Start: 2021-05-10 | End: 2021-05-10

## 2021-05-10 RX ORDER — DIAZEPAM 5 MG
5 TABLET ORAL EVERY 8 HOURS
Refills: 0 | Status: DISCONTINUED | OUTPATIENT
Start: 2021-05-10 | End: 2021-05-10

## 2021-05-10 RX ORDER — AMLODIPINE BESYLATE 2.5 MG/1
5 TABLET ORAL DAILY
Refills: 0 | Status: DISCONTINUED | OUTPATIENT
Start: 2021-05-10 | End: 2021-05-13

## 2021-05-10 RX ORDER — MECLIZINE HCL 12.5 MG
25 TABLET ORAL THREE TIMES A DAY
Refills: 0 | Status: DISCONTINUED | OUTPATIENT
Start: 2021-05-10 | End: 2021-05-13

## 2021-05-10 RX ORDER — SODIUM CHLORIDE 9 MG/ML
1000 INJECTION, SOLUTION INTRAVENOUS
Refills: 0 | Status: DISCONTINUED | OUTPATIENT
Start: 2021-05-10 | End: 2021-05-13

## 2021-05-10 RX ORDER — VALSARTAN 80 MG/1
160 TABLET ORAL DAILY
Refills: 0 | Status: DISCONTINUED | OUTPATIENT
Start: 2021-05-10 | End: 2021-05-13

## 2021-05-10 RX ADMIN — Medication 25 MILLIGRAM(S): at 20:37

## 2021-05-10 RX ADMIN — Medication 25 MILLIGRAM(S): at 11:51

## 2021-05-10 RX ADMIN — SODIUM CHLORIDE 125 MILLILITER(S): 9 INJECTION, SOLUTION INTRAVENOUS at 18:48

## 2021-05-10 NOTE — ED ADULT TRIAGE NOTE - CHIEF COMPLAINT QUOTE
Pt c/o dizziness and head pressure X 1 week. Pt was seen here on 5/5/21, followed up outpatient with neurologist and ENT, pt says "I need MRI". States has had X 3 episodes of vertigo in the last week. Neuro/sensory intact. Also endorsing nausea. Phx: HTN, vertigo

## 2021-05-10 NOTE — ED CDU PROVIDER INITIAL DAY NOTE - NS_EDPROVIDERDISPOUSERTYPE_ED_A_ED
Attending Attestation (For Attendings USE Only)... Elenita Medina)  Pediatrics  [address] [place] [address]  Phone: [phone]  Fax: [phone]  Follow Up Time: 1-3 days

## 2021-05-10 NOTE — ED PROVIDER NOTE - CLINICAL SUMMARY MEDICAL DECISION MAKING FREE TEXT BOX
59 y/o female with pmhx of HTN and vertigo presenting with headache and dizziness  x 1 week, repeat visit for worsening symptoms, sent in by Neuro for MRI with no FND. Will consult neuro, meclizine, labs, and reassess. Symptoms consistent with BPPV with no signs dysdiadochokinesia/signs of central vertigo . Cruz: 61 y/o female with pmhx of HTN and vertigo presenting with headache and dizziness  x 1 week, repeat visit for worsening symptoms, sent in by Neuro for MRI with no FND. Will consult neuro, meclizine, labs, and reassess. Symptoms consistent with BPPV with no signs dysdiadochokinesia/signs of central vertigo .

## 2021-05-10 NOTE — ED ADULT NURSE NOTE - NSIMPLEMENTINTERV_GEN_ALL_ED
Implemented All Fall Risk Interventions:  Portal to call system. Call bell, personal items and telephone within reach. Instruct patient to call for assistance. Room bathroom lighting operational. Non-slip footwear when patient is off stretcher. Physically safe environment: no spills, clutter or unnecessary equipment. Stretcher in lowest position, wheels locked, appropriate side rails in place. Provide visual cue, wrist band, yellow gown, etc. Monitor gait and stability. Monitor for mental status changes and reorient to person, place, and time. Review medications for side effects contributing to fall risk. Reinforce activity limits and safety measures with patient and family.

## 2021-05-10 NOTE — ED CDU PROVIDER INITIAL DAY NOTE - PROGRESS NOTE DETAILS
mario bryant: pt resting comfortably in bed, states that shes no longer dizzy, awaiting mri's, will reasses

## 2021-05-10 NOTE — ED PROVIDER NOTE - PHYSICAL EXAMINATION
Gen: WDWN, NAD  HEENT: PERRLA, EOMI, no nasal discharge, mucous membranes moist, horizontal nystagmus with abduction   CV: Bradycardic with regular rhythm, +S1/S2, no M/R/G  Resp: CTAB, no W/R/R  GI: Abdomen soft non-distended, NTTP, no masses  MSK: No open wounds, no bruising, no LE edema  Neuro: CN2-12 grossly intact, A&Ox4, MS +5/5 in UE and LE BL, finger to nose smooth and rapid, gross sensation intact in UE and LE BL, gait smooth and coordinated, no dysdiadochokinesia  Psych: appropriate mood

## 2021-05-10 NOTE — CONSULT NOTE ADULT - ASSESSMENT
60 F with HTN, prior ETOH abuse (still drinks 4x/week) and internal spinning vertiginous sx since early May, seen in ED for same and followed outpt by ENT and neurologist Dr. Carvajal.    Impression: Suspect peripheral vertigo, given vascular risk factors, would r/o central etiology    [] CDU  [] MRI Brain w/ and w/o (IAC protcol -- write in requisition)  [] check A1c, B12, folate, TSH  [] Clonzepam 0.5mg now then Q12H PRN   [] Reglan 4mg  PRN Q8H for nausea  [] consider gentle hydration    [] [facility] discharge  [facility]   [] ENT f/u   [] Neurology f/u outpatient with Dr. Carvajal    if MRI negative, no neurological contraindication for discharge    d/w Dr. Carvajal who will see pt 5/11

## 2021-05-10 NOTE — SBIRT NOTE ADULT - NSSBIRTALCPASSREFTXDET_GEN_A_CORE
Provided SBIRT services: Full screen positive. Referral to Treatment Performed. Screening results were reviewed with the patient and patient was provided information about healthy guidelines and potential negative consequences associated with level of risk. Motivation and readiness to reduce or stop use was discussed and goals and activities to make changes were suggested/offered. Provided SBIRT services: Full screen positive. Referral to Treatment Performed. Screening results were reviewed with the patient and patient was provided information about healthy guidelines and potential negative consequences associated with level of risk. Motivation and readiness to reduce or stop use was discussed and goals and activities to make changes were suggested/offered. Referral for complete assessment and level of care determination at a certified treatment facility was completed by giving the patient information for treatment facilities that met their needs and encouraging them to call for an appointment. Pt states she wants to call on her own to schedule intake appt. rovided patient with list of outpatient tx programs: [facility] in [facility], and [facility]CHOLA in [place].

## 2021-05-10 NOTE — ED CDU PROVIDER INITIAL DAY NOTE - FAMILY HISTORY
Father  Still living? Unknown  Family history of hypertension, Age at diagnosis: Age Unknown  Family history of hyperglycemia, Age at diagnosis: Age Unknown     Mother  Still living? Unknown  Family history of hypertension, Age at diagnosis: Age Unknown  Family history of hyperglycemia, Age at diagnosis: Age Unknown

## 2021-05-10 NOTE — ED CDU PROVIDER INITIAL DAY NOTE - MEDICAL DECISION MAKING DETAILS
Anthony:  pt with intermittent episodes of dizziness and room spinning for 1 week, CDU for observation and monitoring of symptoms and MRI as per neuro consult.

## 2021-05-10 NOTE — CONSULT NOTE ADULT - SUBJECTIVE AND OBJECTIVE BOX
*************************************  NEUROLOGY CONSULT  SERVICE  **************************************    AB HUGO  Female  MRN-[id number]    HPI:   60 F with HTN, prior ETOH abuse (still drinks 4x/week) and internal spinning vertiginous sx since early May, seen in ED for same and followed outpt by ENT and neurologist Dr. Carvajal. Pt describes brief attacks of significant internal swaying dizziness, associated with flushing, retro-frontal pressure sensation and ringing in both ears lasting 15-25 seconds, multiple times throughout the day, with or without change in head position. Per pt, ENT eval did not reveal any actionable findings. Reportedly has tried Valium which made dizziness worse. Has not been driving because of these attacks. Currently she denies any sx, feels at baseline. Neurological exam with subtle horizontal nystagmus but otherwise non-focal. NIHSS =0, MRS =0    ROS: All negative except as mentioned in HPI    PAST MEDICAL & SURGICAL HISTORY:  Hypertension    H/O tubal ligation      MEDICATIONS  (STANDING):    MEDICATIONS  (PRN):    Allergies    penicillin (Swelling)    Intolerances        VITAL SIGNS:  Vital Signs Last 24 Hrs  T(C): 36.3 (10 May 2021 12:37), Max: 36.6 (10 May 2021 10:44)  T(F): 97.4 (10 May 2021 12:37), Max: 97.9 (10 May 2021 10:44)  HR: 60 (10 May 2021 12:37) (58 - 60)  BP: 111/74 (10 May 2021 12:37) (111/74 - 114/60)  BP(mean): --  RR: 16 (10 May 2021 12:37) (16 - 16)  SpO2: 100% (10 May 2021 12:37) (58% - 100%)    PHYSICAL EXAMINATION:  General: Well-developed, well nourished, in no acute distress.  Neck: Supple, nontender  Lung: no respiratory distress noted  Neurologic:  - Mental Status:  Alert, awake, oriented to person, place, and time; Speech is fluent with intact naming, repetition, and comprehension; crosses midline, no extinction or neglect noted;   - Cranial Nerves II-XII:  VFF, mild horizontal nystagmus noted, EOMI, PERRLA, V1-V3 intact, no facial asymmetry, t/p midline, SCM/trap intact.  - Motor:  Strength is 5/5 throughout.  There is no pronator drift.  Normal muscle bulk and tone throughout. No myoclonus or tremor.  - Reflexes:  Plantar responses flexor.  - Sensory:  Intact to light touch throughout.  - Coordination:  Finger-nose-finger without dysmetria.  Rapid alternating hand movements intact.  - Gait:   Normal steps, base, arm swing, and turning.  Heel and toe walking are normal.  Tandem gait is normal.  Romberg testing is negative.    LABS:                          11.5   8.45  )-----------( 295      ( 10 May 2021 12:29 )             34.0     05-10    140  |  104  |  8   ----------------------------<  100<H>  3.4<L>   |  23  |  0.68    Ca    9.3      10 May 2021 12:29    TPro  7.5  /  Alb  4.3  /  TBili  0.3  /  DBili  x   /  AST  36<H>  /  ALT  27  /  AlkPhos  81  05-10        RADIOLOGY & ADDITIONAL STUDIES:      pending

## 2021-05-10 NOTE — ED ADULT NURSE NOTE - OBJECTIVE STATEMENT
Pt A&Ox4, presents to the ED with complaints of daily sporadic episodes of dizziness, nausea and pressure like pain in her head and behind her eyes. PT. denies vomiting. Pt states she has Vertigo and Hypertension. Within the last week she has had more frequent bouts of dizziness and pain (3x), as well decreased visual acuity due to blurry vision. Pt followed up with neuro and ENT was prescribed diazepam, claims her symptoms worsened and has not taken since. Pt A&Ox4, presents to the ED with complaints of daily sporadic episodes of dizziness, nausea and pressure like pain in her head and behind her eyes. PT. denies vomiting. Pt states she has Vertigo and Hypertension. Has not smoked a cigarette in over a week, uses nicotine gum. Pt has not had ETOH in over a week but normally drinks 4/5 times a week ans has 1-4 drinks. Within the last week she has had more frequent bouts of dizziness and pain (3x), as well decreased visual acuity due to blurry vision. Pt followed up with neuro and ENT was prescribed diazepam, claims her symptoms worsened and has not taken since. 20G IV obtained R AC, flushing without resistance, labs obtained. Call bell given to pt.

## 2021-05-10 NOTE — ED PROVIDER NOTE - OBJECTIVE STATEMENT
61 y/o female with pmhx of HTN and vertigo presenting with headache and dizziness  x 1 week. Patient presented to ER 5/5 for similar complaint and has since seen ENT and neuro outpatient. Sent in by neuro for MRI. Headache is b/l, localized to forehead, with associated dizziness and blurry vision and ringing in her ears. Patient took valium prescribed to her by ENT/Neuro with worsening episode of dizziness so she has since stopped taking it. Patient denies fevers/chills, n/v/d, cough, rashes, dysuria but does report increased urinary frequency. Patient also denies weakness, numbness/tingling, or changes in hearing or balance.

## 2021-05-10 NOTE — ED PROVIDER NOTE - NS ED ROS FT
Gen: Denies fever, weight loss  CV: Denies chest pain, palpitations  Skin: Denies rash, erythema, color changes  Resp: Denies SOB, cough  Endo: Denies sensitivity to heat, cold,  GI: Denies diarrhea, constipation, nausea, vomiting  Msk: Denies back pain, LE swelling, extremity pain  : Denies dysuria  Neuro: Denies LOC, weakness, numbness/tingling

## 2021-05-10 NOTE — ED CDU PROVIDER INITIAL DAY NOTE - OBJECTIVE STATEMENT
59 y/o female with pmhx of HTN and vertigo presenting with headache and dizziness  x 1 week. Patient presented to ER 5/5 for similar complaint and has since seen ENT and neuro outpatient. Sent in by neuro for MRI. Headache is b/l, localized to forehead, with associated dizziness and blurry vision and ringing in her ears. Patient took valium prescribed to her by ENT/Neuro with worsening episode of dizziness so she has since stopped taking it. Patient denies fevers/chills, n/v/d, cough, rashes, dysuria but does report increased urinary frequency. Patient also denies weakness, numbness/tingling, or changes in hearing or balance. 61 y/o female with pmhx of HTN and vertigo presenting with headache and dizziness  x 1 week. Patient presented to ER 5/5 for similar complaint and has since seen ENT and neuro outpatient. Sent in by neuro for MRI. Headache is b/l, localized to forehead, with associated dizziness and blurry vision and ringing in her ears. Patient took valium prescribed to her by ENT/Neuro with worsening episode of dizziness so she has since stopped taking it. Patient denies fevers/chills, n/v/d, cough, rashes, dysuria but does report increased urinary frequency. Patient also denies weakness, numbness/tingling, or changes in hearing or balance.    CDU Luiz AZEVEDO: Agree with above. In the main, patient was feeling dizzy without fever, chills, chest pain, shortness of breath, abdominal pain, nausea, vomiting, painful urination, facial droop, slurred speech, headache, acute changes in vision. Patient received basic labs, ua, ekg and meclizine - since symptoms have improved. Able to ambulate without difficulty. Seen by Neuro in main. CDU for MR and tele monitoring. Neuro recs reviewed - will send off additional labs.

## 2021-05-11 VITALS — TEMPERATURE: 99 F | DIASTOLIC BLOOD PRESSURE: 77 MMHG | SYSTOLIC BLOOD PRESSURE: 122 MMHG | HEART RATE: 97 BPM

## 2021-05-11 LAB
CULTURE RESULTS: SIGNIFICANT CHANGE UP
FOLATE SERPL-MCNC: 9.1 NG/ML — SIGNIFICANT CHANGE UP (ref 3.1–17.5)
SPECIMEN SOURCE: SIGNIFICANT CHANGE UP
VIT B12 SERPL-MCNC: 351 PG/ML — SIGNIFICANT CHANGE UP (ref 200–900)

## 2021-05-11 PROCEDURE — 99217: CPT

## 2021-05-11 PROCEDURE — 70553 MRI BRAIN STEM W/O & W/DYE: CPT | Mod: 26

## 2021-05-11 RX ORDER — MECLIZINE HCL 12.5 MG
1 TABLET ORAL
Qty: 15 | Refills: 0
Start: 2021-05-11 | End: 2021-05-15

## 2021-05-11 RX ADMIN — Medication 25 MILLIGRAM(S): at 06:09

## 2021-05-11 RX ADMIN — VALSARTAN 160 MILLIGRAM(S): 80 TABLET ORAL at 06:09

## 2021-05-11 RX ADMIN — AMLODIPINE BESYLATE 5 MILLIGRAM(S): 2.5 TABLET ORAL at 06:09

## 2021-05-11 NOTE — ED CDU PROVIDER DISPOSITION NOTE - PATIENT PORTAL LINK FT
You can access the FollowMyHealth Patient Portal offered by [facility] by registering at the following website: [email or web address]. By joining [employer]’s FollowMyHealth portal, you will also be able to view your health information using other applications (apps) compatible with our system.

## 2021-05-11 NOTE — ED CDU PROVIDER DISPOSITION NOTE - NSFOLLOWUPINSTRUCTIONS_ED_ALL_ED_FT
Please follow with your Neurologist, Dr Carvajal.    Please follow with ENT.     Please follow for vestibular therapy, referral list provided to you.     You can take Meclizine 25 mg tablet every 8 hours as needed for dizziness.       Vertigo is the feeling that you or your surroundings are moving when they are not. Benign positional vertigo is the most common form of vertigo. This is usually a harmless condition (benign). This condition is positional. This means that symptoms are triggered by certain movements and positions.    This condition can be dangerous if it occurs while you are doing something that could cause harm to you or others. This includes activities such as driving or operating machinery.      What are the causes?    The inner ear has fluid-filled canals that help your brain sense movement and balance. When the fluid moves, the brain receives messages about your body's position.    With benign positional vertigo, crystals in the inner ear break free and disturb the inner ear area. This causes your brain to receive confusing messages about your body's position.      What increases the risk?  You are more likely to develop this condition if:  •You are a woman.      •You are 50 years of age or older.      •You have recently had a head injury.      •You have an inner ear disease.        What are the signs or symptoms?  Symptoms of this condition usually happen when you move your head or your eyes in different directions. Symptoms may start suddenly, and usually last for less than a minute. They include:  •Loss of balance and falling.      •Feeling like you are spinning or moving.      •Feeling like your surroundings are spinning or moving.      •Nausea and vomiting.      •Blurred vision.      •Dizziness.      •Involuntary eye movement (nystagmus).      Symptoms can be mild and cause only minor problems, or they can be severe and interfere with daily life. Episodes of benign positional vertigo may return (recur) over time. Symptoms may improve over time.      How is this diagnosed?  This condition may be diagnosed based on:  •Your medical history.      •Physical exam of the head, neck, and ears.      •Positional tests to check for or stimulate vertigo. You may be asked to turn your head and change positions, such as going from sitting to lying down. A health care provider will watch for symptoms of vertigo.      You may be referred to a health care provider who specializes in ear, nose, and throat problems (ENT, or otolaryngologist) or a provider who specializes in disorders of the nervous system (neurologist).      How is this treated?     This condition may be treated in a session in which your health care provider moves your head in specific positions to help the displaced crystals in your inner ear move. Treatment for this condition may take several sessions. Surgery may be needed in severe cases, but this is rare.    In some cases, benign positional vertigo may resolve on its own in 2–4 weeks.      Follow these instructions at home:    Safety     •Move slowly. Avoid sudden body or head movements or certain positions, as told by your health care provider.      •Avoid driving until your health care provider says it is safe for you to do so.      •Avoid operating heavy machinery until your health care provider says it is safe for you to do so.      •Avoid doing any tasks that would be dangerous to you or others if vertigo occurs.      •If you have trouble walking or keeping your balance, try using a cane for stability. If you feel dizzy or unstable, sit down right away.      •Return to your normal activities as told by your health care provider. Ask your health care provider what activities are safe for you.      General instructions     •Take over-the-counter and prescription medicines only as told by your health care provider.      •Drink enough fluid to keep your urine pale yellow.      •Keep all follow-up visits as told by your health care provider. This is important.        Contact a health care provider if:    •You have a fever.      •Your condition gets worse or you develop new symptoms.      •Your family or friends notice any behavioral changes.      •You have nausea or vomiting that gets worse.      •You have numbness or a prickling and tingling sensation.        Get help right away if you:    •Have difficulty speaking or moving.      •Are always dizzy.      •Faint.      •Develop severe headaches.      •Have weakness in your legs or arms.      •Have changes in your hearing or vision.      •Develop a stiff neck.      •Develop sensitivity to light.        Summary    •Vertigo is the feeling that you or your surroundings are moving when they are not. Benign positional vertigo is the most common form of vertigo.      •This condition is caused by crystals in the inner ear that become displaced. This causes a disturbance in an area of the inner ear that helps your brain sense movement and balance.      •Symptoms include loss of balance and falling, feeling that you or your surroundings are moving, nausea and vomiting, and blurred vision.      •This condition can be diagnosed based on symptoms, a physical exam, and positional tests.      •Follow safety instructions as told by your health care provider. You will also be told when to contact your health care provider in case of problems.

## 2021-05-11 NOTE — ED CDU PROVIDER SUBSEQUENT DAY NOTE - PROGRESS NOTE DETAILS
mario bryant: pt rested comfortably in bed all night, on tele, awaiting mri, will reasses 61 y/o female with a PMHX of HTN and vertigo presenting with headache and dizziness  x 1 week. Patient presented to ER 5/5 for similar complaint and has since seen ENT and neuro outpatient. Sent in by neuro for MRI.  In ER basic labs grossly unremarkable, ua, ekg and meclizine - since symptoms have improved. Able to ambulate without difficulty. Seen by Neuro. CDU for MR and tele monitoring. pending official MRI result and neuro reassessment today.    This morning: Repeat neuro exam normal:  CN II-XII normal.  5/5 UE and LE strength.  FTN intact.  Negative Pronator drift.  Sensation intact to light touch. patient reports improvement and ambulating to the bathroom without difficulty.

## 2021-05-11 NOTE — ED CDU PROVIDER DISPOSITION NOTE - ATTENDING CONTRIBUTION TO CARE
I have seen and evaluated the patient face to face, endorse the HPI, PE, as edited and/or reviewed by me as needed and have indicated my contribution to care in the MDM and/or clincal course section.

## 2021-05-11 NOTE — ED ADULT NURSE REASSESSMENT NOTE - NURSING NEURO LEVEL OF CONSCIOUSNESS
alert and awake/follows commands
alert and awake/follows commands
alert and awake
alert and awake
alert and awake/follows commands

## 2021-05-11 NOTE — ED CDU PROVIDER DISPOSITION NOTE - CLINICAL COURSE
61 y/o female with a PMHX of HTN and vertigo presenting with headache and dizziness  x 1 week. Patient presented to ER 5/5 for similar complaint and has since seen ENT and neuro outpatient. Sent in by neuro for MRI.  In ER basic labs grossly unremarkable, ua, ekg and meclizine - since symptoms have improved. Able to ambulate without difficulty. Seen by Neuro. CDU for MR and tele monitoring. MRI unremarkable brain and IAC    This morning: Repeat neuro exam normal:  CN II-XII normal.  5/5 UE and LE strength.  FTN intact.  Negative Pronator drift.  Sensation intact to light touch. patient reports improvement and ambulating to the bathroom without difficulty. Patient seen by [facility] neurologist Dr. Carvajal, cleared for dc home. Patient provided with Vestibular therapy referral list. Cruz: 61 y/o female with a PMHX of HTN and vertigo presenting with headache and dizziness  x 1 week. Patient presented to ER 5/5 for similar complaint and has since seen ENT and neuro outpatient. Sent in by neuro for MRI.  In ER basic labs grossly unremarkable, ua, ekg and meclizine - since symptoms have improved. Able to ambulate without difficulty. Seen by Neuro. CDU for MR and tele monitoring. MRI unremarkable brain and IAC    This morning: Repeat neuro exam normal:  CN II-XII normal.  5/5 UE and LE strength.  FTN intact.  Negative Pronator drift.  Sensation intact to light touch. patient reports improvement and ambulating to the bathroom without difficulty. Patient seen by [facility] neurologist Dr. Carvajal, cleared for dc home. Patient provided with Vestibular therapy referral list.

## 2021-05-11 NOTE — ED CDU PROVIDER SUBSEQUENT DAY NOTE - HISTORY
61 y/o female with pmhx of HTN and vertigo presenting with headache and dizziness  x 1 week. Patient presented to ER 5/5 for similar complaint and has since seen ENT and neuro outpatient. Sent in by neuro for MRI. Headache is b/l, localized to forehead, with associated dizziness and blurry vision and ringing in her ears. Patient took valium prescribed to her by ENT/Neuro with worsening episode of dizziness so she has since stopped taking it. Patient denies fevers/chills, n/v/d, cough, rashes, dysuria but does report increased urinary frequency. Patient also denies weakness, numbness/tingling, or changes in hearing or balance. In the main, patient was feeling dizzy without fever, chills, chest pain, shortness of breath, abdominal pain, nausea, vomiting, painful urination, facial droop, slurred speech, headache, acute changes in vision. Patient received basic labs, ua, ekg and meclizine - since symptoms have improved. Able to ambulate without difficulty. Seen by Neuro in main. CDU for MR and tele monitoring. Neuro recs reviewed - will send off additional labs.

## 2021-07-07 ENCOUNTER — RESULT REVIEW (OUTPATIENT)
Age: 61
End: 2021-07-07
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
## 2021-11-03 ENCOUNTER — APPOINTMENT (OUTPATIENT)
Dept: ORTHOPEDIC SURGERY | Facility: CLINIC | Age: 61
End: 2021-11-03
Payer: COMMERCIAL

## 2021-11-03 VITALS
DIASTOLIC BLOOD PRESSURE: 85 MMHG | BODY MASS INDEX: 29.99 KG/M2 | SYSTOLIC BLOOD PRESSURE: 131 MMHG | HEIGHT: 65 IN | HEART RATE: 77 BPM | WEIGHT: 180 LBS

## 2021-11-03 DIAGNOSIS — M25.552 PAIN IN LEFT HIP: ICD-10-CM

## 2021-11-03 DIAGNOSIS — G89.29 PAIN IN RIGHT KNEE: ICD-10-CM

## 2021-11-03 DIAGNOSIS — M25.562 PAIN IN RIGHT KNEE: ICD-10-CM

## 2021-11-03 DIAGNOSIS — M25.561 PAIN IN RIGHT KNEE: ICD-10-CM

## 2021-11-03 DIAGNOSIS — M43.16 SPONDYLOLISTHESIS, LUMBAR REGION: ICD-10-CM

## 2021-11-03 DIAGNOSIS — M87.051 IDIOPATHIC ASEPTIC NECROSIS OF RIGHT FEMUR: ICD-10-CM

## 2021-11-03 DIAGNOSIS — M54.50 LOW BACK PAIN, UNSPECIFIED: ICD-10-CM

## 2021-11-03 PROCEDURE — 72100 X-RAY EXAM L-S SPINE 2/3 VWS: CPT

## 2021-11-03 PROCEDURE — 73564 X-RAY EXAM KNEE 4 OR MORE: CPT | Mod: RT

## 2021-11-03 PROCEDURE — 99203 OFFICE O/P NEW LOW 30 MIN: CPT | Mod: 25

## 2021-11-03 PROCEDURE — 20610 DRAIN/INJ JOINT/BURSA W/O US: CPT | Mod: LT

## 2021-11-03 PROCEDURE — 73522 X-RAY EXAM HIPS BI 3-4 VIEWS: CPT

## 2021-11-03 RX ORDER — DICLOFENAC SODIUM 75 MG/1
75 TABLET, DELAYED RELEASE ORAL TWICE DAILY
Qty: 60 | Refills: 0 | Status: ACTIVE | COMMUNITY
Start: 2021-11-03 | End: 1900-01-01

## 2021-11-03 RX ORDER — CELECOXIB 200 MG/1
200 CAPSULE ORAL DAILY
Qty: 60 | Refills: 2 | Status: ACTIVE | COMMUNITY
Start: 2021-11-03 | End: 1900-01-01

## 2021-11-03 NOTE — HISTORY OF PRESENT ILLNESS
[de-identified] : Ms. AB HUGO is a 61 year female who presents to office complaining of bilateral knee and left hip/lower back pain x 1 year with insidious onset.\par Pains are described as constant aching pains which are worsened with weightbearing and ambulating activities.\par Pain in the knees are in the medial compartments and pain in the hip is posterior.\par Going up and down stairs and getting up from a seated position hurts these areas more so.\par She does have sickle cell trait.She has not had any sickle cell crisis is.\par She says rest, icy/hot, and Motrin help to relieve the pain.\par Denies radiating lower back pain or distal neuropathy.\par She has not previously been evaluated for these issues by another provider before.\par All review of systems, family history, social history, surgical history, past medical history, medications, and allergies not previously stated as positive are negative. They were reviewed by me today with the patient and documented accordingly.

## 2021-11-03 NOTE — PROCEDURE
[de-identified] : Procedure Note: \par \par Anatomic Location:  left hip trochanteric bursitis\par \par Diagnosis:  hip trochanteric bursitis\par \par Procedure:  Injection of 6ccs of Marcaine 0.5% plain and Depo-Medrol 1cc (40 MG)\par \par Local Spray: Ethyl Chloride.\par \par Skin preparation with alcohol.\par \par Patient has consented for the procedure.\par \par Injection 22-gauge spinal needle  through a direct lateral approach.\par \par Patient tolerated the procedure well.\par \par Patient instructed to call the office if any reaction, fever, chills, increased erythema or swelling.   [phone].

## 2021-11-03 NOTE — PHYSICAL EXAM
[de-identified] : Constitutional - the patient is of normal build and nutrition.  The patient is somewhat overweight with a BMI of almost 30 however she carries a lot of the weight around her hips.  The patient remains oriented to person, time, and  place.  Mood is normal. Vital signs as recorded.  The patients gait is WNL. The patient has satisfactory  balance and can stand on toes and heels.\par \par The patient has no difficulty with respiration. Respiration at rest is a normal rate. The patient is not short of breath and has not become short of breath with short ambulation. There is no audible wheezing. No coughing.\par \par Skin is normal for the patient's age. There are no abnormal masses or lymph nodes which stand out in the lower extremities.\par \par Spine - deep tendon reflexes are symmetric. Motor and sensory are symmetric.  She does have some discomfort in her low back and this does radiate toward her left hip but may be a separate from the pain she has directly over her left greater trochanter which is more consistent with trochanteric bursitis.  Neurologically her legs are symmetric.\par \par \par UPPER EXTREMITIES \par \par Shoulders ROM  is symmetric  and the motion is satisfactory.  There is no significant shoulder pain or limitation in motion which would make using a cane or a walker difficult. Shoulder stability and  strength are satisfactory.\par \par There is normal motion in the wrists and elbows.\par \par Circulation appears satisfactory with pedal pulses present.  There is no major edema in the lower legs. No skin tenderness or increased temperature. No major varicosities.\par \par HIP EXAMINATION the abduction and abduction as well as rotation measurements were taken with the hip in flexion.\par \par Motion\par She has symmetric hip motion with flexion of 135 degrees, abduction 80 degrees adduction 35 degrees external rotation 80 degrees internal rotation 20 degrees.  When moving her left hip however she has pain directly over her greater trochanter.  This is consistent with trochanteric bursitis.\par \par  There is no crepitus in either hip. The alignment of the hips is normal.\par \par \par KNEE EXAMINATION\par \par Motion\par Right Knee has 0 to 135 degrees of motion with good medial  lateral and anterior posterior stability.  There is no major effusion.  There is no Baker's cyst.  There is no significant patellofemoral crepitus.  The patient has satisfactory strength across the knee.  Both knees give her some aching especially with compression of the patella and possibly somewhat over the medial joint line.              \par Left  Knee   has 0 to 135 degrees of motion with good medial lateral and anterior posterior stability.  There is no major effusion there is no Baker's cyst.  There is no significant patellofemoral crepitus.  The patient has satisfactory strength across the knee.        She does have some pain with compression of her patella and also over the medial joint line but is not severe.    \par \par Ankle and foot examination\par Of the ankle has normal motion.  There is normal ankle stability.  The patient has no major abnormalities of the foot.\par \par \par \par  [de-identified] : 4 views were done of the left and the right knees of these knees show both knees have what appears to be increased bone density in the distal femora consistent with bone infarcts.  The joint space however is maintained medially and laterally both the standing and the [place] views.  The lateral views demonstrate very well also the extensive bone infarcts.  He does have some peripheral patellofemoral degeneration bilaterally.\par \par An AP of the pelvis and lateral of the right left hip show femoral heads around joint spaces maintained with no evidence of significant arthritis and no calcium over the soft tissues however this does not exclude trochanteric bursitis. \par \par With a spondylolisthesis of L4 anteriorly on L5 but beside that level the foramina are generally maintained.  An AP and lateral of the lumbar show spine shows a slight list to the right

## 2021-11-03 NOTE — REASON FOR VISIT
[Initial Visit] : an initial visit for [FreeTextEntry2] : bilateral knee and left hip/lower back pain

## 2021-11-03 NOTE — DISCUSSION/SUMMARY
[de-identified] : The patient tolerated the injection well I feel that a lot of her localized pain is from her trochanteric bursitis of the left hip.  She is getting some symptoms of her back and some of her anterior knees and she will try Celebrex and the Celebrex 200 mg a day can be supplemented with Tylenol extra strength 2 tablets up to 3 times a day.  Return visit in 3 to 6 months as needed.

## 2021-11-24 ENCOUNTER — RX RENEWAL (OUTPATIENT)
Age: 61
End: 2021-11-24

## 2022-04-18 ENCOUNTER — APPOINTMENT (OUTPATIENT)
Dept: ORTHOPEDIC SURGERY | Facility: CLINIC | Age: 62
End: 2022-04-18
Payer: COMMERCIAL

## 2022-04-18 VITALS
WEIGHT: 200 LBS | DIASTOLIC BLOOD PRESSURE: 80 MMHG | HEIGHT: 65 IN | BODY MASS INDEX: 33.32 KG/M2 | SYSTOLIC BLOOD PRESSURE: 125 MMHG | HEART RATE: 105 BPM

## 2022-04-18 DIAGNOSIS — M51.36 OTHER INTERVERTEBRAL DISC DEGENERATION, LUMBAR REGION: ICD-10-CM

## 2022-04-18 DIAGNOSIS — M43.16 SPONDYLOLISTHESIS, LUMBAR REGION: ICD-10-CM

## 2022-04-18 PROCEDURE — 99214 OFFICE O/P EST MOD 30 MIN: CPT

## 2022-05-17 ENCOUNTER — APPOINTMENT (OUTPATIENT)
Dept: ORTHOPEDIC SURGERY | Facility: CLINIC | Age: 62
End: 2022-05-17
Payer: COMMERCIAL

## 2022-05-17 VITALS
HEIGHT: 65 IN | BODY MASS INDEX: 33.32 KG/M2 | DIASTOLIC BLOOD PRESSURE: 81 MMHG | WEIGHT: 200 LBS | HEART RATE: 76 BPM | SYSTOLIC BLOOD PRESSURE: 120 MMHG

## 2022-05-17 DIAGNOSIS — M25.512 PAIN IN LEFT SHOULDER: ICD-10-CM

## 2022-05-17 PROCEDURE — 99214 OFFICE O/P EST MOD 30 MIN: CPT

## 2022-05-17 PROCEDURE — 73030 X-RAY EXAM OF SHOULDER: CPT | Mod: RT

## 2022-05-17 NOTE — DISCUSSION/SUMMARY
[de-identified] : The patient has bilateral rotator cuff disease with subacromial narrowing and degenerative change.  Her pain is intermittent and she is not that symptomatic at this time.  I have discussed the pathology and natural history with her.  The plan at this point would be for her to take Tylenol, ibuprofen or diclofenac if the pain becomes more significant.  If those medications or not helpful she will be reevaluated and we may consider physical therapy, different medical management or steroid injections.  The patient is in agreement with the plan.

## 2022-05-17 NOTE — HISTORY OF PRESENT ILLNESS
[de-identified] : This 62-year-old right-handed woman presents for evaluation of pain in both shoulders and arms.  She had an episode of severe left shoulder and arm pain 1 year ago.  1 month after that she developed similar pain in her right shoulder and arm.  The pain was quite severe and and necessitated visits to the emergency room twice.  She had no injury to either shoulder.  She denies upper extremity numbness or tingling.  She has taken Tylenol and has taken diclofenac with variable pain relief.  She denies neck pain.

## 2022-05-17 NOTE — PHYSICAL EXAM
[Rad] : radial 2+ and symmetric bilaterally [Normal] : Alert and in no acute distress [Poor Appearance] : well-appearing [Acute Distress] : not in acute distress [Obese] : not obese [de-identified] : The patient has no respiratory distress. Mood and affect are normal. The patient is alert and oriented to person, place and time.\par Examination of the cervical spine demonstrates no tenderness, no deformity and no muscle spasm. Cervical spine rotation is 60° to the right, 60° to the left, 75° of extension and 45° of flexion. Neurologic exam of the upper extremities reveals intact sensation to light touch. Motor function is 5 over 5 in all groups. Deep tendon reflexes are 2+ and equal at the biceps, triceps and brachioradialis.\par Examination of the shoulders demonstrates no swelling or deformity.  There is no focal tenderness.  She experiences pain with active and passive flexion beyond 90 degrees on the left.  There is no instability.  Drop arm test is negative.  Belly press test is negative.  [place] test is negative.  The elbows are stable and nontender.  The skin is intact.  There is no lymphedema. [de-identified] : AP, transscapular and axillary x-rays of both shoulders taken today demonstrate no fracture or dislocation.  The patient has narrowing of the subacromial space and degenerative changes consistent with chronic rotator cuff disease.

## 2022-06-21 ENCOUNTER — RX RENEWAL (OUTPATIENT)
Age: 62
End: 2022-06-21

## 2022-06-21 RX ORDER — DICLOFENAC SODIUM 75 MG/1
75 TABLET, DELAYED RELEASE ORAL
Qty: 60 | Refills: 1 | Status: ACTIVE | COMMUNITY
Start: 2022-04-18 | End: 1900-01-01

## 2022-06-23 ENCOUNTER — APPOINTMENT (OUTPATIENT)
Dept: ORTHOPEDIC SURGERY | Facility: CLINIC | Age: 62
End: 2022-06-23
Payer: COMMERCIAL

## 2022-06-23 VITALS
SYSTOLIC BLOOD PRESSURE: 125 MMHG | HEIGHT: 65 IN | BODY MASS INDEX: 33.32 KG/M2 | WEIGHT: 200 LBS | DIASTOLIC BLOOD PRESSURE: 80 MMHG | HEART RATE: 81 BPM

## 2022-06-23 DIAGNOSIS — M25.511 PAIN IN RIGHT SHOULDER: ICD-10-CM

## 2022-06-23 DIAGNOSIS — M25.512 PAIN IN RIGHT SHOULDER: ICD-10-CM

## 2022-06-23 PROCEDURE — 20610 DRAIN/INJ JOINT/BURSA W/O US: CPT | Mod: LT

## 2022-06-23 PROCEDURE — 99214 OFFICE O/P EST MOD 30 MIN: CPT | Mod: 25

## 2022-06-23 NOTE — DISCUSSION/SUMMARY
[de-identified] : The patient has bilateral rotator cuff disease with subacromial narrowing and degenerative change.  Her pain has gotten more severe since her prior visit.  The pathology, natural history and treatment options were discussed.  She is interested in steroid injections for both shoulders.\par Informed consent is obtained. Site and procedure were confirmed with the patient. Following a sterile prep with alcohol an injection is placed into the subacromial space of the right shoulder. The injection consists of 1 cc of Depo-Medrol and 8 cc of 1% Xylocaine. The injection was well tolerated. Instructions were given.\par Informed consent is obtained. Site and procedure were confirmed with the patient.  Following a sterile prep with alcohol an injection is placed into the subacromial space of the left shoulder. The injection consists of 1 cc of Depo-Medrol and 8 cc of 1% Xylocaine. The injection was well tolerated. Instructions were given.\par She will return as needed.

## 2022-06-23 NOTE — PHYSICAL EXAM
[Rad] : radial 2+ and symmetric bilaterally [Normal] : Alert and in no acute distress [Poor Appearance] : well-appearing [Acute Distress] : not in acute distress [Obese] : not obese [de-identified] : The patient has no respiratory distress. Mood and affect are normal. The patient is alert and oriented to person, place and time.\par Examination of the cervical spine demonstrates no tenderness, no deformity and no muscle spasm. Cervical spine rotation is 60° to the right, 60° to the left, 75° of extension and 45° of flexion. Neurologic exam of the upper extremities reveals intact sensation to light touch. Motor function is 5 over 5 in all groups. Deep tendon reflexes are 2+ and equal at the biceps, triceps and brachioradialis.\par Examination of the shoulders demonstrates no swelling or deformity.  There is no focal tenderness.  She experiences pain with active and passive flexion beyond 90 degrees on the left.  There is no instability.  Drop arm test is negative.  Belly press test is negative.  [place] test is negative.  The elbows are stable and nontender.  The skin is intact.  There is no lymphedema.

## 2022-06-23 NOTE — HISTORY OF PRESENT ILLNESS
[de-identified] : The patient presents for reevaluation of both shoulders. She reports that both shoulders were hurting on and off but the pain became constant 3 weeks ago. Yesterday she had a sudden increase in left shoulder pain. She has been unable to move her arm above her head. She had no relief with diclofenac. She is currently taking Tylenol 1000 mg which gives her better relief. She is interested in having cortisone injections today.

## 2022-09-14 ENCOUNTER — OUTPATIENT (OUTPATIENT)
Dept: OUTPATIENT SERVICES | Facility: HOSPITAL | Age: 62
LOS: 1 days | End: 2022-09-14

## 2022-09-14 VITALS
HEIGHT: 65 IN | OXYGEN SATURATION: 98 % | WEIGHT: 216.05 LBS | RESPIRATION RATE: 16 BRPM | TEMPERATURE: 97 F | DIASTOLIC BLOOD PRESSURE: 75 MMHG | HEART RATE: 92 BPM | SYSTOLIC BLOOD PRESSURE: 116 MMHG

## 2022-09-14 DIAGNOSIS — D48.61 NEOPLASM OF UNCERTAIN BEHAVIOR OF RIGHT BREAST: ICD-10-CM

## 2022-09-14 DIAGNOSIS — Z98.51 TUBAL LIGATION STATUS: Chronic | ICD-10-CM

## 2022-09-14 DIAGNOSIS — C50.919 MALIGNANT NEOPLASM OF UNSPECIFIED SITE OF UNSPECIFIED FEMALE BREAST: ICD-10-CM

## 2022-09-14 DIAGNOSIS — I10 ESSENTIAL (PRIMARY) HYPERTENSION: ICD-10-CM

## 2022-09-14 LAB
A1C WITH ESTIMATED AVERAGE GLUCOSE RESULT: 6.2 % — HIGH (ref 4–5.6)
ALBUMIN SERPL ELPH-MCNC: 4.3 G/DL — SIGNIFICANT CHANGE UP (ref 3.3–5)
ALP SERPL-CCNC: 147 U/L — HIGH (ref 40–120)
ALT FLD-CCNC: 18 U/L — SIGNIFICANT CHANGE UP (ref 4–33)
ANION GAP SERPL CALC-SCNC: 12 MMOL/L — SIGNIFICANT CHANGE UP (ref 7–14)
AST SERPL-CCNC: 12 U/L — SIGNIFICANT CHANGE UP (ref 4–32)
BILIRUB SERPL-MCNC: <0.2 MG/DL — SIGNIFICANT CHANGE UP (ref 0.2–1.2)
BUN SERPL-MCNC: 16 MG/DL — SIGNIFICANT CHANGE UP (ref 7–23)
CALCIUM SERPL-MCNC: 9.6 MG/DL — SIGNIFICANT CHANGE UP (ref 8.4–10.5)
CHLORIDE SERPL-SCNC: 104 MMOL/L — SIGNIFICANT CHANGE UP (ref 98–107)
CO2 SERPL-SCNC: 23 MMOL/L — SIGNIFICANT CHANGE UP (ref 22–31)
CREAT SERPL-MCNC: 0.69 MG/DL — SIGNIFICANT CHANGE UP (ref 0.5–1.3)
EGFR: 97 ML/MIN/1.73M2 — SIGNIFICANT CHANGE UP
ESTIMATED AVERAGE GLUCOSE: 131 — SIGNIFICANT CHANGE UP
GLUCOSE SERPL-MCNC: 122 MG/DL — HIGH (ref 70–99)
HCT VFR BLD CALC: 39.1 % — SIGNIFICANT CHANGE UP (ref 34.5–45)
HGB BLD-MCNC: 12.7 G/DL — SIGNIFICANT CHANGE UP (ref 11.5–15.5)
MCHC RBC-ENTMCNC: 26.1 PG — LOW (ref 27–34)
MCHC RBC-ENTMCNC: 32.5 GM/DL — SIGNIFICANT CHANGE UP (ref 32–36)
MCV RBC AUTO: 80.5 FL — SIGNIFICANT CHANGE UP (ref 80–100)
NRBC # BLD: 0 /100 WBCS — SIGNIFICANT CHANGE UP (ref 0–0)
NRBC # FLD: 0 K/UL — SIGNIFICANT CHANGE UP (ref 0–0)
PLATELET # BLD AUTO: 451 K/UL — HIGH (ref 150–400)
POTASSIUM SERPL-MCNC: 4.5 MMOL/L — SIGNIFICANT CHANGE UP (ref 3.5–5.3)
POTASSIUM SERPL-SCNC: 4.5 MMOL/L — SIGNIFICANT CHANGE UP (ref 3.5–5.3)
PROT SERPL-MCNC: 7.7 G/DL — SIGNIFICANT CHANGE UP (ref 6–8.3)
RBC # BLD: 4.86 M/UL — SIGNIFICANT CHANGE UP (ref 3.8–5.2)
RBC # FLD: 17.1 % — HIGH (ref 10.3–14.5)
SODIUM SERPL-SCNC: 139 MMOL/L — SIGNIFICANT CHANGE UP (ref 135–145)
WBC # BLD: 16.34 K/UL — HIGH (ref 3.8–10.5)
WBC # FLD AUTO: 16.34 K/UL — HIGH (ref 3.8–10.5)

## 2022-09-14 PROCEDURE — 93010 ELECTROCARDIOGRAM REPORT: CPT

## 2022-09-14 RX ORDER — VALSARTAN 80 MG/1
1 TABLET ORAL
Qty: 0 | Refills: 0 | DISCHARGE

## 2022-09-14 NOTE — H&P PST ADULT - ACTIVITY
Pt states she can walk 3 to 4 blocks but c/o of lower back pain  - can go up 1 flight stairs w/o SOB

## 2022-09-14 NOTE — H&P PST ADULT - HISTORY OF PRESENT ILLNESS
Pt is a 62 yr old female scheduled for Biopsy Right Breast with [facility] with Dr Feldman tentatively 9/26/22 - pt had routine mammo and right breast lesion noted - pt had c/o of mild pain - biopsy was high risk for Ca and now for surgical biopsy. Pt hx of recent Dx RA - finishing prednisone loading dose - hx HTN,   Pt given information for COVID PCR testing sites and told to make appointment 3-5 days preop     Pt is a 60 y/o Female with pmhx of HTN, DM presents to ER c/o of dizziness, "shaking" and fevers x1week.  "im in alcohol withdrawal".  patient reports was on a 5 day briggs where she drank a bottle of red wine nightly, although admits to drinking a bottle of wine most nights of the week.  The day after began feeling shaky, and having fevers, admit she does feel like that sometimes after drinking but able to control "withdrawal" with Tylenol PM.  Reports symptoms persisted so went to [facility] 2 days ago and was diagnosed with UTi- has been taking keflex for symptoms.  Reports daily fevers - Tmax 103 Reports still feeling dizziness and chills - head swinging sensation.  Denies acute visual changes, numbness/tinglng, falls, head trauma, abdominal pain, n/v. Pt is a 62 yr old female scheduled for Biopsy Right Breast with [facility] with Dr Feldman tentatively 9/26/22 - pt had routine mammo and right breast lesion noted - pt had c/o of mild pain - biopsy was high risk for Ca and now for surgical biopsy. Pt hx of recent Dx RA - finishing prednisone loading dose - hx HTN, ETOH abuse ( quit 1 yr ago),  Hep B hx   Pt given information for COVID PCR testing sites and told to make appointment 3-5 days preop

## 2022-09-14 NOTE — H&P PST ADULT - NSICDXFAMILYHX_GEN_ALL_CORE_FT
FAMILY HISTORY:  Father  Still living? Unknown  Family history of hyperglycemia, Age at diagnosis: Age Unknown  Family history of hypertension, Age at diagnosis: Age Unknown    Mother  Still living? Unknown  Family history of hyperglycemia, Age at diagnosis: Age Unknown  Family history of hypertension, Age at diagnosis: Age Unknown

## 2022-09-14 NOTE — H&P PST ADULT - MUSCULOSKELETAL COMMENTS
Pt c/o of diffuse joint pain especially right lower arm/wrist / hand - tested and Dx with RA and placed on  loading dose of prednisone that is tapered - pt also c/o of chronic lower back pain with sciatica to both legs at times

## 2022-09-14 NOTE — H&P PST ADULT - NSICDXPASTMEDICALHX_GEN_ALL_CORE_FT
PAST MEDICAL HISTORY:  ETOH abuse quit 1 yr ago    H/O rheumatoid arthritis     History of hepatitis B     HTN (hypertension)     Hypertension      PAST MEDICAL HISTORY:  ETOH abuse quit 1 yr ago    H/O rheumatoid arthritis     History of hepatitis B     HTN (hypertension)     Hypertension     Lumbar disc disorder     Malignant neoplasm of breast right    Smoking hx

## 2022-09-14 NOTE — H&P PST ADULT - PROBLEM SELECTOR PLAN 1
Pt scheduled for surgery and preop instructions including instructions for taking Famotidine and for Chlorhexidine use in showering on the day of surgery, given verbally and with use of  written materials, and patient confirming understanding of such instructions using  teach back method.  Pt given information for COVID PCR testing sites and told to make appointment 3-5 days preop   Pt to see PCP for MC

## 2022-09-14 NOTE — H&P PST ADULT - CIGARETTES, PACKS/DAY
Pt from [facility].  Pt laid down on dining room and was altered.  Pt oriented to person/place which is baseline per staff.     César Cuellar RN  03/04/20 5480     0.5

## 2022-09-19 ENCOUNTER — RESULT REVIEW (OUTPATIENT)
Age: 62
End: 2022-09-19

## 2022-09-19 PROBLEM — Z86.19 PERSONAL HISTORY OF OTHER INFECTIOUS AND PARASITIC DISEASES: Chronic | Status: ACTIVE | Noted: 2022-09-14

## 2022-09-19 PROBLEM — C50.919 MALIGNANT NEOPLASM OF UNSPECIFIED SITE OF UNSPECIFIED FEMALE BREAST: Chronic | Status: ACTIVE | Noted: 2022-09-14

## 2022-09-19 PROBLEM — M51.9 UNSPECIFIED THORACIC, THORACOLUMBAR AND LUMBOSACRAL INTERVERTEBRAL DISC DISORDER: Chronic | Status: ACTIVE | Noted: 2022-09-14

## 2022-09-19 PROBLEM — I10 ESSENTIAL (PRIMARY) HYPERTENSION: Chronic | Status: ACTIVE | Noted: 2022-09-14

## 2022-09-19 PROBLEM — Z87.39 PERSONAL HISTORY OF OTHER DISEASES OF THE MUSCULOSKELETAL SYSTEM AND CONNECTIVE TISSUE: Chronic | Status: ACTIVE | Noted: 2022-09-14

## 2022-09-19 PROBLEM — F10.10 ALCOHOL ABUSE, UNCOMPLICATED: Chronic | Status: ACTIVE | Noted: 2022-09-14

## 2022-09-19 PROBLEM — Z87.891 PERSONAL HISTORY OF NICOTINE DEPENDENCE: Chronic | Status: ACTIVE | Noted: 2022-09-14

## 2022-09-25 ENCOUNTER — TRANSCRIPTION ENCOUNTER (OUTPATIENT)
Age: 62
End: 2022-09-25

## 2022-09-26 ENCOUNTER — OUTPATIENT (OUTPATIENT)
Dept: OUTPATIENT SERVICES | Facility: HOSPITAL | Age: 62
LOS: 1 days | Discharge: HOME | End: 2022-09-26

## 2022-09-26 ENCOUNTER — TRANSCRIPTION ENCOUNTER (OUTPATIENT)
Age: 62
End: 2022-09-26

## 2022-09-26 ENCOUNTER — OUTPATIENT (OUTPATIENT)
Dept: OUTPATIENT SERVICES | Facility: HOSPITAL | Age: 62
LOS: 1 days | End: 2022-09-26
Payer: COMMERCIAL

## 2022-09-26 ENCOUNTER — APPOINTMENT (OUTPATIENT)
Dept: MAMMOGRAPHY | Facility: IMAGING CENTER | Age: 62
End: 2022-09-26

## 2022-09-26 ENCOUNTER — RESULT REVIEW (OUTPATIENT)
Age: 62
End: 2022-09-26

## 2022-09-26 VITALS
WEIGHT: 216.05 LBS | TEMPERATURE: 96 F | SYSTOLIC BLOOD PRESSURE: 125 MMHG | HEIGHT: 65 IN | OXYGEN SATURATION: 97 % | RESPIRATION RATE: 18 BRPM | HEART RATE: 79 BPM | DIASTOLIC BLOOD PRESSURE: 78 MMHG

## 2022-09-26 VITALS
OXYGEN SATURATION: 100 % | TEMPERATURE: 98 F | RESPIRATION RATE: 16 BRPM | DIASTOLIC BLOOD PRESSURE: 73 MMHG | SYSTOLIC BLOOD PRESSURE: 98 MMHG | HEART RATE: 76 BPM

## 2022-09-26 DIAGNOSIS — D48.61 NEOPLASM OF UNCERTAIN BEHAVIOR OF RIGHT BREAST: ICD-10-CM

## 2022-09-26 DIAGNOSIS — Z98.51 TUBAL LIGATION STATUS: Chronic | ICD-10-CM

## 2022-09-26 DIAGNOSIS — Z00.8 ENCOUNTER FOR OTHER GENERAL EXAMINATION: ICD-10-CM

## 2022-09-26 PROCEDURE — 19281 PERQ DEVICE BREAST 1ST IMAG: CPT | Mod: RT

## 2022-09-26 PROCEDURE — 19281 PERQ DEVICE BREAST 1ST IMAG: CPT

## 2022-09-26 PROCEDURE — C1739: CPT

## 2022-09-26 PROCEDURE — 88360 TUMOR IMMUNOHISTOCHEM/MANUAL: CPT | Mod: 26

## 2022-09-26 PROCEDURE — 88342 IMHCHEM/IMCYTCHM 1ST ANTB: CPT | Mod: 26,59

## 2022-09-26 PROCEDURE — 76098 X-RAY EXAM SURGICAL SPECIMEN: CPT | Mod: 26

## 2022-09-26 PROCEDURE — 88305 TISSUE EXAM BY PATHOLOGIST: CPT | Mod: 26

## 2022-09-26 PROCEDURE — 76098 X-RAY EXAM SURGICAL SPECIMEN: CPT

## 2022-09-26 RX ORDER — TRAMADOL HYDROCHLORIDE 50 MG/1
1 TABLET ORAL
Qty: 0 | Refills: 0 | DISCHARGE

## 2022-09-26 RX ORDER — DICLOFENAC SODIUM 75 MG/1
1 TABLET, DELAYED RELEASE ORAL
Qty: 0 | Refills: 0 | DISCHARGE

## 2022-09-26 RX ORDER — MOXIFLOXACIN HYDROCHLORIDE TABLETS, 400 MG 400 MG/1
1 TABLET, FILM COATED ORAL
Qty: 14 | Refills: 0
Start: 2022-09-26 | End: 2022-10-02

## 2022-09-26 RX ORDER — OXYCODONE HYDROCHLORIDE 5 MG/1
1 TABLET ORAL
Qty: 5 | Refills: 0
Start: 2022-09-26

## 2022-09-26 RX ORDER — SODIUM CHLORIDE 9 MG/ML
1000 INJECTION, SOLUTION INTRAVENOUS
Refills: 0 | Status: DISCONTINUED | OUTPATIENT
Start: 2022-09-26 | End: 2022-10-10

## 2022-09-26 RX ORDER — AMLODIPINE AND VALSARTAN 5; 320 MG/1; MG/1
1 TABLET, FILM COATED ORAL
Qty: 0 | Refills: 0 | DISCHARGE

## 2022-09-26 NOTE — ASU DISCHARGE PLAN (ADULT/PEDIATRIC) - NS MD DC FALL RISK RISK
For information on Fall & Injury Prevention, visit: [email or web address] OR  [email or web address] OR  https://www.cdc.gov/steadi/patient.html

## 2022-09-26 NOTE — ASU DISCHARGE PLAN (ADULT/PEDIATRIC) - ASU DC SPECIAL INSTRUCTIONSFT
Do not take dressing off until follow up appointment     Please alternate between ibuprofen and tylenol every 3 hours for pain. You can take 1 oxycodone every 6 hours as needed for pain not controlled by ibuprofen and tylenol.

## 2022-09-26 NOTE — PACU DISCHARGE NOTE - PAIN:
Medication Management [address]  [phone] (phone)  [phone] (fax)    Ms. Praveen Marie is a 75 y.o.  female with history of Afib who presents today for anticoagulation monitoring and adjustment. Patient verifies current dosing regimen and tablet strength. No missed or extra doses. Patient denies s/s bleeding/bruising/swelling/SOB/chest pain  No blood in urine or stool. Patient reports having peas the last two days - She states that this is a little more than normal for her. Provided patient with list of vitamin K foods to see if there is anything else that may be affecting INR. No changes in medication/OTC agents/Herbals. Patient finished dicloxacillin - Already off of medication list.   No change in alcohol use or tobacco use. No change in activity level. Patient denies headaches/dizziness/lightheadedness/falls. No vomiting/diarrhea or acute illness. No Procedures scheduled in the future at this time. Assessment:   Lab Results   Component Value Date    INR 1.30 (H) 09/01/2021    INR 1.30 (H) 08/25/2021    INR 1.50 (H) 08/18/2021     INR subtherapeutic   Recent Labs     09/01/21  1517   INR 1.30*       Plan:  Take Coumadin 5 mg today 9/1 and 3.75 mg tomorrow 9/2, then increase Coumadin to 5 mg MWF and 2.5 mg TuThSaSu (11.1% increase due to several consecutive subtherapeutic INRs despite increasing dose). Recheck INR in 6 day(s) on 9/7. Patient reminded to call the Anticoagulation Clinic with signs or symptoms of bleeding or with any medication changes. Patient given instructions utilizing the teach back method. After visit summary printed and reviewed with patient. Discharged ambulatory in no apparent distress.     For Pharmacy Admin Tracking Only     Intervention Detail: Dose Adjustment: 1, reason: Therapy Optimization   Total # of Interventions Recommended: 1   Total # of Interventions Accepted: 1   Time Spent (min): 5620 [address] Nyasia, PharmD   9/1/2021, 5:07 PM Controlled with current regime

## 2022-09-26 NOTE — ASU DISCHARGE PLAN (ADULT/PEDIATRIC) - CARE PROVIDER_API CALL
Skinny Feldman)  Surgery  [address] [place] [address]  Phone: [phone]  Fax: [phone]  Follow Up Time: 1 week

## 2022-10-03 LAB — SURGICAL PATHOLOGY STUDY: SIGNIFICANT CHANGE UP

## 2022-10-21 NOTE — ED PROVIDER NOTE - NS ED MD DISPO ADMITTING SERVICE
[FreeTextEntry1] : Impression: Bilateral plantar fasciitis, pain right third finger.\par \par This patient will be treated with diclofenac with GI precautions along with physical therapy for her feet.  She will return on a as needed basis the potential for injection has been discussed Respiratory (Pediatric) MED

## 2023-01-03 ENCOUNTER — APPOINTMENT (OUTPATIENT)
Dept: GASTROENTEROLOGY | Facility: CLINIC | Age: 63
End: 2023-01-03

## 2023-02-13 NOTE — ED CDU PROVIDER NOTE - PMH
Hypertension Detail Level: Detailed General Sunscreen Counseling: I recommended a broad spectrum sunscreen with a SPF of 30 or higher.  I explained that SPF 30 sunscreens block approximately 97 percent of the sun's harmful rays.  Sunscreens should be applied at least 15 minutes prior to expected sun exposure and then every 2 hours after that as long as sun exposure continues. If swimming or exercising sunscreen should be reapplied every 45 minutes to an hour after getting wet or sweating.  One ounce, or the equivalent of a shot glass full of sunscreen, is adequate to protect the skin not covered by a bathing suit. I also recommended a lip balm with a sunscreen as well. Sun protective clothing can be used in lieu of sunscreen but must be worn the entire time you are exposed to the sun's rays.

## 2023-02-21 ENCOUNTER — EMERGENCY (EMERGENCY)
Facility: HOSPITAL | Age: 63
LOS: 1 days | Discharge: HOME | End: 2023-02-21
Attending: STUDENT IN AN ORGANIZED HEALTH CARE EDUCATION/TRAINING PROGRAM | Admitting: STUDENT IN AN ORGANIZED HEALTH CARE EDUCATION/TRAINING PROGRAM
Payer: COMMERCIAL

## 2023-02-21 VITALS
SYSTOLIC BLOOD PRESSURE: 88 MMHG | HEART RATE: 79 BPM | DIASTOLIC BLOOD PRESSURE: 60 MMHG | TEMPERATURE: 99 F | OXYGEN SATURATION: 100 % | RESPIRATION RATE: 16 BRPM

## 2023-02-21 VITALS
RESPIRATION RATE: 17 BRPM | HEART RATE: 78 BPM | SYSTOLIC BLOOD PRESSURE: 100 MMHG | OXYGEN SATURATION: 100 % | DIASTOLIC BLOOD PRESSURE: 79 MMHG

## 2023-02-21 DIAGNOSIS — Z98.51 TUBAL LIGATION STATUS: Chronic | ICD-10-CM

## 2023-02-21 LAB
ALBUMIN SERPL ELPH-MCNC: 4 G/DL — SIGNIFICANT CHANGE UP (ref 3.3–5)
ALP SERPL-CCNC: 134 U/L — HIGH (ref 40–120)
ALT FLD-CCNC: 9 U/L — SIGNIFICANT CHANGE UP (ref 4–33)
ANION GAP SERPL CALC-SCNC: 16 MMOL/L — HIGH (ref 7–14)
ANISOCYTOSIS BLD QL: SIGNIFICANT CHANGE UP
AST SERPL-CCNC: 20 U/L — SIGNIFICANT CHANGE UP (ref 4–32)
BASOPHILS # BLD AUTO: 0 K/UL — SIGNIFICANT CHANGE UP (ref 0–0.2)
BASOPHILS NFR BLD AUTO: 0 % — SIGNIFICANT CHANGE UP (ref 0–2)
BILIRUB SERPL-MCNC: 0.3 MG/DL — SIGNIFICANT CHANGE UP (ref 0.2–1.2)
BUN SERPL-MCNC: 13 MG/DL — SIGNIFICANT CHANGE UP (ref 7–23)
CALCIUM SERPL-MCNC: 9.6 MG/DL — SIGNIFICANT CHANGE UP (ref 8.4–10.5)
CHLORIDE SERPL-SCNC: 103 MMOL/L — SIGNIFICANT CHANGE UP (ref 98–107)
CO2 SERPL-SCNC: 18 MMOL/L — LOW (ref 22–31)
CREAT SERPL-MCNC: 0.7 MG/DL — SIGNIFICANT CHANGE UP (ref 0.5–1.3)
EGFR: 97 ML/MIN/1.73M2 — SIGNIFICANT CHANGE UP
EOSINOPHIL # BLD AUTO: 0.34 K/UL — SIGNIFICANT CHANGE UP (ref 0–0.5)
EOSINOPHIL NFR BLD AUTO: 2.5 % — SIGNIFICANT CHANGE UP (ref 0–6)
FLUAV AG NPH QL: SIGNIFICANT CHANGE UP
FLUBV AG NPH QL: SIGNIFICANT CHANGE UP
GIANT PLATELETS BLD QL SMEAR: PRESENT — SIGNIFICANT CHANGE UP
GLUCOSE SERPL-MCNC: 135 MG/DL — HIGH (ref 70–99)
HCT VFR BLD CALC: 38.9 % — SIGNIFICANT CHANGE UP (ref 34.5–45)
HEMOLYSIS INDEX: 10 — SIGNIFICANT CHANGE UP
HGB BLD-MCNC: 12.6 G/DL — SIGNIFICANT CHANGE UP (ref 11.5–15.5)
HYPOCHROMIA BLD QL: SLIGHT — SIGNIFICANT CHANGE UP
IANC: 7.17 K/UL — SIGNIFICANT CHANGE UP (ref 1.8–7.4)
LYMPHOCYTES # BLD AUTO: 30.8 % — SIGNIFICANT CHANGE UP (ref 13–44)
LYMPHOCYTES # BLD AUTO: 4.14 K/UL — HIGH (ref 1–3.3)
MACROCYTES BLD QL: SLIGHT — SIGNIFICANT CHANGE UP
MAGNESIUM SERPL-MCNC: 2.1 MG/DL — SIGNIFICANT CHANGE UP (ref 1.6–2.6)
MCHC RBC-ENTMCNC: 25.8 PG — LOW (ref 27–34)
MCHC RBC-ENTMCNC: 32.4 GM/DL — SIGNIFICANT CHANGE UP (ref 32–36)
MCV RBC AUTO: 79.6 FL — LOW (ref 80–100)
MICROCYTES BLD QL: SLIGHT — SIGNIFICANT CHANGE UP
MONOCYTES # BLD AUTO: 0.35 K/UL — SIGNIFICANT CHANGE UP (ref 0–0.9)
MONOCYTES NFR BLD AUTO: 2.6 % — SIGNIFICANT CHANGE UP (ref 2–14)
NEUTROPHILS # BLD AUTO: 6.89 K/UL — SIGNIFICANT CHANGE UP (ref 1.8–7.4)
NEUTROPHILS NFR BLD AUTO: 51.3 % — SIGNIFICANT CHANGE UP (ref 43–77)
NRBC # BLD: 1 /100 — HIGH (ref 0–0)
PHOSPHATE SERPL-MCNC: 3.4 MG/DL — SIGNIFICANT CHANGE UP (ref 2.5–4.5)
PLAT MORPH BLD: ABNORMAL
PLATELET # BLD AUTO: 335 K/UL — SIGNIFICANT CHANGE UP (ref 150–400)
PLATELET COUNT - ESTIMATE: NORMAL — SIGNIFICANT CHANGE UP
POLYCHROMASIA BLD QL SMEAR: SLIGHT — SIGNIFICANT CHANGE UP
POTASSIUM SERPL-MCNC: 3.8 MMOL/L — SIGNIFICANT CHANGE UP (ref 3.5–5.3)
POTASSIUM SERPL-MCNC: 4.1 MMOL/L — SIGNIFICANT CHANGE UP (ref 3.5–5.3)
POTASSIUM SERPL-SCNC: 3.8 MMOL/L — SIGNIFICANT CHANGE UP (ref 3.5–5.3)
POTASSIUM SERPL-SCNC: 4.1 MMOL/L — SIGNIFICANT CHANGE UP (ref 3.5–5.3)
PROT SERPL-MCNC: 7.8 G/DL — SIGNIFICANT CHANGE UP (ref 6–8.3)
RBC # BLD: 4.89 M/UL — SIGNIFICANT CHANGE UP (ref 3.8–5.2)
RBC # FLD: 18 % — HIGH (ref 10.3–14.5)
RBC BLD AUTO: ABNORMAL
RSV RNA NPH QL NAA+NON-PROBE: SIGNIFICANT CHANGE UP
SARS-COV-2 RNA SPEC QL NAA+PROBE: SIGNIFICANT CHANGE UP
SODIUM SERPL-SCNC: 137 MMOL/L — SIGNIFICANT CHANGE UP (ref 135–145)
TARGETS BLD QL SMEAR: SLIGHT — SIGNIFICANT CHANGE UP
VARIANT LYMPHS # BLD: 12.8 % — HIGH (ref 0–6)
WBC # BLD: 13.44 K/UL — HIGH (ref 3.8–10.5)
WBC # FLD AUTO: 13.44 K/UL — HIGH (ref 3.8–10.5)

## 2023-02-21 PROCEDURE — 99284 EMERGENCY DEPT VISIT MOD MDM: CPT

## 2023-02-21 RX ORDER — SODIUM CHLORIDE 9 MG/ML
1000 INJECTION, SOLUTION INTRAVENOUS ONCE
Refills: 0 | Status: COMPLETED | OUTPATIENT
Start: 2023-02-21 | End: 2023-02-21

## 2023-02-21 RX ADMIN — SODIUM CHLORIDE 1000 MILLILITER(S): 9 INJECTION, SOLUTION INTRAVENOUS at 11:48

## 2023-02-21 NOTE — ED PROVIDER NOTE - NSICDXPASTMEDICALHX_GEN_ALL_CORE_FT
PAST MEDICAL HISTORY:  ETOH abuse quit 1 yr ago    H/O rheumatoid arthritis     History of hepatitis B     HTN (hypertension)     Hypertension     Lumbar disc disorder     Malignant neoplasm of breast right    Smoking hx

## 2023-02-21 NOTE — ED PROVIDER NOTE - ATTENDING CONTRIBUTION TO CARE
62F h/o HTN, vertigo, arthritis on Plaquenil p/w dizziness. States she recently started new low carb and diet and intermittent fasting 1 wk ago and noted symptom onset while eating first meal at 11am. Pt noted sudden onset lightheadedness, dizziness and then had episode of nausea, vomiting x2. Now feels symptomatically improved.  Denies fever/chills, cough, diarrhea, abd pain, travel, dysuria. No LOC. Hx of vertigo in the past. No tobacco, former alcohol use (1 yr ago), no recreational drug use.   Gen: nad  Neuro: CN II-XII grossly intact, 5/5 strength b/l upper and lower extremities, speech clear, sensation intact   CV: rrr, no appreciable murmur  Pulm: clear lungs  Abd: soft, ntnd  Ext: no edema/swelling  Skin: no rash  MDM: 62F h/o HTN, vertigo, arthritis on Plaquenil p/w dizziness likely 2/2 existing vertigo vs extreme diet change vs hypovolemia from intermittent fasting - will get cbc, cmp, Mg, phos, IVF. No abd imaging required as pt without pain and nontender on exam

## 2023-02-21 NOTE — ED PROVIDER NOTE - NSFOLLOWUPINSTRUCTIONS_ED_ALL_ED_FT
Please follow up with PCP within 1 week  Return for severe pain, difficulty breathing, passing out, unable to eat/drink.  Please avoid doing both dietary changes at once- pick one or the other (intermittent fasting or decreasing carb intake) and discuss with PCP on how to proceed.

## 2023-02-21 NOTE — ED PROVIDER NOTE - DIFFERENTIAL DIAGNOSIS
Differential Diagnosis DDx includes but is not limited to- vertigo, orthostatic hypotension, vasovagal, electrolyte abnormality, dehydration

## 2023-02-21 NOTE — ED PROVIDER NOTE - PROGRESS NOTE DETAILS
Benjamin Gregorio, PGY-3- patient feels improved - ambulatory. BP stable. PCP f/u advised. Discussed results of work up with patient. Patient agrees with plan to discharge home. All questions answered regarding plan. Strict return precautions given.

## 2023-02-21 NOTE — ED PROVIDER NOTE - OBJECTIVE STATEMENT
Benjamin Gregorio, PGY-3- 62 year old female with a pmhx of HTN, arthritis, on Plaquenil, is brought to ED by EMS for evaluation of dizziness. Pt reports she went to eat around 1100 this morning and had sudden onset lightheadedness, dizziness, nausea, vomiting x2, diaphoresis. Pt reports difficulty ambulating when she went to get her neighbor to call 911. Pt reports feeling better now. Notes she started a new diet 1 week ago that consists of not eating carbs and intermittent fasting from 6393-7022 daily. Notes she took her BP meds this AM and hadn't yet eaten today. Has not had diet supervised by physician. No recent fever, cough, diarrhea, abd pain, travel, dysuria. No LOC. Hx of vertigo in the past. No tobacco, former alcohol use (1 yr ago), no recreational drug use. Allergy to Penicillin.

## 2023-02-21 NOTE — ED PROVIDER NOTE - CLINICAL SUMMARY MEDICAL DECISION MAKING FREE TEXT BOX
Benjamin Gregorio, PGY-3- 62 year old female with hx of HTN, arthritis, on Plaquenil, recent diet change that includes avoiding carbs and intermittent fasting, presenting for eval of dizziness that started this morning. Pt with assoc lightheadedness, nausea, diaphoresis. Sounds near syncope - probably orthostatic vs vasovagal episode. Soft BP in triage, now improved in room. Likely 2/2 not eating since yesterday and new diet without carbs. Neuro exam non focal. Plan to check labs, treat with IVF, likely dc if asymptomatic and feeling better. Unlikely to be central pathology given hypotension, improvement of sxs, and surrounding clinical picture. Possible peripheral vertigo, however, more likely 2/2 dietary change.

## 2023-02-21 NOTE — ED ADULT NURSE NOTE - OBJECTIVE STATEMENT
Pt received to rm 3 presents with dizziness associated with n/v. Reports she had episode n/v this morning when friend called ambulance for her. Pt endorses hx of vertigo, reports she remains feeling dizzy. Endorses to recently changing her diet to intermittent fasting and low carb, with prolonged duration of fasting. Pt denies CP, fever, chills, blurry vision, SOB, headache or any other symptoms. Pt NSR on CM, resident at bedside assessing pt, will await orders and continue to monitor.

## 2023-02-21 NOTE — ED ADULT NURSE REASSESSMENT NOTE - NS ED NURSE REASSESS COMMENT FT1
Break RN- Patient received in stretcher. Respirations even and unlabored. Spontaneous movement of all extremities noted. Repeat labs drawn. No signs of acute distress noted. Comfort and safety maintained. All current care needs met. Care plan continued Carrie GARIBAY

## 2023-02-21 NOTE — ED PROVIDER NOTE - PHYSICAL EXAMINATION
General: well appearing, no acute distress, AOx3  Skin: no rash, no pallor  Head: normocephalic, atraumatic  Eyes: clear conjunctiva, EOMI, PERRL  ENMT: airway patent, no nasal discharge  Cardiovascular: normal rate, normal rhythm, S1/S2  Pulmonary: clear to auscultation bilaterally, no rales, rhonchi, or wheeze  Abdomen: soft, nontender, no guarding   Musculoskeletal: moving extremities well, no deformity  Neuro: CN II-XII grossly intact, 5/5 strength b/l upper and lower extremities, speech clear, sensation intact   Psych: normal mood, normal affect

## 2023-02-21 NOTE — ED ADULT TRIAGE NOTE - CHIEF COMPLAINT QUOTE
Pt brought in by EMS complaining of dizziness, nausea and vomiting. Pt denies chest pain, sob, fever or chills. Pt hypotensive in triage, charge RN aware.

## 2023-02-21 NOTE — ED PROVIDER NOTE - PATIENT PORTAL LINK FT
You can access the FollowMyHealth Patient Portal offered by [facility] [place] by registering at the following website: [email or web address]. By joining [facility]’s FollowMyHealth portal, you will also be able to view your health information using other applications (apps) compatible with our system.

## 2023-04-11 ENCOUNTER — APPOINTMENT (OUTPATIENT)
Dept: OTOLARYNGOLOGY | Facility: CLINIC | Age: 63
End: 2023-04-11
Payer: COMMERCIAL

## 2023-04-11 VITALS
WEIGHT: 210 LBS | BODY MASS INDEX: 34.99 KG/M2 | SYSTOLIC BLOOD PRESSURE: 125 MMHG | TEMPERATURE: 96 F | HEART RATE: 80 BPM | HEIGHT: 65 IN | DIASTOLIC BLOOD PRESSURE: 74 MMHG

## 2023-04-11 PROCEDURE — 92557 COMPREHENSIVE HEARING TEST: CPT

## 2023-04-11 PROCEDURE — 92567 TYMPANOMETRY: CPT

## 2023-04-11 PROCEDURE — 99204 OFFICE O/P NEW MOD 45 MIN: CPT

## 2023-04-11 RX ORDER — AMLODIPINE BESYLATE 5 MG/1
TABLET ORAL
Refills: 0 | Status: ACTIVE | COMMUNITY

## 2023-04-11 RX ORDER — HYDROXYCHLOROQUINE SULFATE 400 MG/1
TABLET ORAL
Refills: 0 | Status: ACTIVE | COMMUNITY

## 2023-04-11 NOTE — HISTORY OF PRESENT ILLNESS
[de-identified] : Started with problems about 2 years ago - started suddenly.  Was seen in ED with neg MRI .  Has had 3-4 more episode since.   Occ fell getting out of bed. Recently saw primary care for this.  Was treated with meclizine - did not help.  Most episodes occur with looking up or down or turning head to either side.  Occ vomiting.  Years ago episode were only 10 minutes.  Most recently issues last days.  NO blackout or diplopia.  No change in hearing or ringing in ears.  No prior ear problems.   No hx of migraines.

## 2023-04-11 NOTE — REVIEW OF SYSTEMS
[Dizziness] : dizziness [Lightheadedness] : lightheadedness [Vomiting] : vomiting [Negative] : Heme/Lymph [de-identified] : patient has fallen from her dizziness. Dizziness happens at random times. Ears feel clogged  [FreeTextEntry7] : nausea

## 2023-04-11 NOTE — ASSESSMENT
[FreeTextEntry1] : Patient with multiple episodes of vertigo.  May have BPPV but appears to going on in multiple positions - also now has problems which last hours.  Exam unremarkable with symmetric snhl .  Recommended vng and pending result may need imaging, neuro or neurotology - may also consider vestibular therapy.

## 2023-04-11 NOTE — DATA REVIEWED
[de-identified] : normal to moderate SNHL AU with a conductive component in the right ear at 1kHz\par Type Ad in the right ear; unable to maintain hermetic seal in the left ear

## 2023-05-03 ENCOUNTER — APPOINTMENT (OUTPATIENT)
Dept: OTOLARYNGOLOGY | Facility: CLINIC | Age: 63
End: 2023-05-03
Payer: MEDICARE

## 2023-05-03 PROCEDURE — 92537 CALORIC VSTBLR TEST W/REC: CPT

## 2023-05-03 PROCEDURE — 92567 TYMPANOMETRY: CPT

## 2023-05-03 PROCEDURE — 92540 BASIC VESTIBULAR EVALUATION: CPT

## 2023-06-28 ENCOUNTER — APPOINTMENT (OUTPATIENT)
Dept: OTOLARYNGOLOGY | Facility: CLINIC | Age: 63
End: 2023-06-28
Payer: MEDICARE

## 2023-06-28 VITALS
HEART RATE: 98 BPM | DIASTOLIC BLOOD PRESSURE: 80 MMHG | WEIGHT: 220 LBS | HEIGHT: 65 IN | BODY MASS INDEX: 36.65 KG/M2 | SYSTOLIC BLOOD PRESSURE: 119 MMHG

## 2023-06-28 DIAGNOSIS — R42 DIZZINESS AND GIDDINESS: ICD-10-CM

## 2023-06-28 DIAGNOSIS — H93.293 OTHER ABNORMAL AUDITORY PERCEPTIONS, BILATERAL: ICD-10-CM

## 2023-06-28 DIAGNOSIS — H90.3 SENSORINEURAL HEARING LOSS, BILATERAL: ICD-10-CM

## 2023-06-28 PROCEDURE — 99214 OFFICE O/P EST MOD 30 MIN: CPT | Mod: 25

## 2023-06-28 PROCEDURE — 92504 EAR MICROSCOPY EXAMINATION: CPT

## 2023-06-28 NOTE — ASSESSMENT
[FreeTextEntry1] : Otoscopic exam today shows intact normal-appearing bilateral tympanic membranes without effusion or retraction.  Claiborne-Hallpike and supine roll maneuvers are negative for nystagmus, and she has no spontaneous, head impulse, or gaze evoked nystagmus.\par \par I personally viewed and interpreted prior audiogram results which showed bilateral downsloping sensorineural hearing loss with mostly symmetric thresholds, I also personally reviewed VNG test results which were normal.  I personally reviewed and interpreted prior MRI images and the report from 9/2022, which shows no IAC or CPA pathology.\par \par Suspect low blood pressure/orthostasis as cause for recurring episodes of dizziness/hot flashes/disequilibrium.  Patient is also prediabetic so this may be a contributing risk factor for orthostatic hypotension.  Patient has not had episodes since lowering blood pressure pill to half a tablet daily, so for now would recommend observation.  Follow-up with ENT should patient develop any new onset vertigo in association with worsened hearing loss and/or tinnitus.

## 2023-06-28 NOTE — HISTORY OF PRESENT ILLNESS
[de-identified] : 62 y/o F presents for evaluation of her dizziness\par reports dizziness started 2 years ago where she would be sitting and start to feel hot "sinking feeling" and become very dizzy and would have difficulty walking accompanied with low BP, reports decreasing antihypertensives to half the dose. Evaluated by ophthalmologist. reports a few trips to the ER, prescribed meclizine from PCP that made dizziness worst. VNG done.

## 2023-06-28 NOTE — REASON FOR VISIT
[Initial Consultation] : an initial consultation for [FreeTextEntry2] : referred by another ENT to follow up for dizziness

## 2023-10-27 NOTE — PATIENT PROFILE ADULT - PRIMARY SOURCE OF SUPPORT/COMFORT
Outpatient Care Management  Patient Not Qualified    Patient: Ally Valdez  MRN:  [id number]  Date of Service:  10/27/2023  Completed by:  Kate Paulino RN    Chief Complaint   Patient presents with    OPCM Chart Review    Case Closure       Patient Summary           Reason Not Qualified:  Resides in Nursing Home    RENNY Paulino RN    
parent/child(sudheer)

## 2023-11-06 ENCOUNTER — APPOINTMENT (OUTPATIENT)
Dept: CT IMAGING | Facility: IMAGING CENTER | Age: 63
End: 2023-11-06

## 2023-12-07 ENCOUNTER — NON-APPOINTMENT (OUTPATIENT)
Age: 63
End: 2023-12-07

## 2023-12-07 ENCOUNTER — APPOINTMENT (OUTPATIENT)
Dept: THORACIC SURGERY | Facility: CLINIC | Age: 63
End: 2023-12-07
Payer: MEDICARE

## 2023-12-07 VITALS — HEIGHT: 65 IN | WEIGHT: 220 LBS | BODY MASS INDEX: 36.65 KG/M2

## 2023-12-07 DIAGNOSIS — F17.210 NICOTINE DEPENDENCE, CIGARETTES, UNCOMPLICATED: ICD-10-CM

## 2023-12-07 DIAGNOSIS — F17.200 NICOTINE DEPENDENCE, UNSPECIFIED, UNCOMPLICATED: ICD-10-CM

## 2023-12-07 PROCEDURE — G0296 VISIT TO DETERM LDCT ELIG: CPT | Mod: 95

## 2023-12-08 ENCOUNTER — OUTPATIENT (OUTPATIENT)
Dept: OUTPATIENT SERVICES | Facility: HOSPITAL | Age: 63
LOS: 1 days | End: 2023-12-08
Payer: MEDICARE

## 2023-12-08 ENCOUNTER — APPOINTMENT (OUTPATIENT)
Dept: CT IMAGING | Facility: IMAGING CENTER | Age: 63
End: 2023-12-08
Payer: MEDICARE

## 2023-12-08 DIAGNOSIS — Z98.51 TUBAL LIGATION STATUS: Chronic | ICD-10-CM

## 2023-12-08 DIAGNOSIS — Z00.8 ENCOUNTER FOR OTHER GENERAL EXAMINATION: ICD-10-CM

## 2023-12-08 PROCEDURE — 71271 CT THORAX LUNG CANCER SCR C-: CPT

## 2023-12-08 PROCEDURE — 71271 CT THORAX LUNG CANCER SCR C-: CPT | Mod: 26

## 2023-12-17 PROBLEM — F17.210 CIGARETTE SMOKER: Status: ACTIVE | Noted: 2023-12-17

## 2023-12-17 NOTE — HISTORY OF PRESENT ILLNESS
[TextBox_13] : Referred by Dr. Madrigal  Ms. AB HUGO is a 63 year old woman with a history of nicotine dependence   Over the telephone today we reviewed and confirmed that the patient meets screening eligibility criteria:  -Age: 63 years old  Smoking status:   -Current smoker  -Number of pack(s) per day:.5  -Number of years smoked:51  -Number of pack years smokin     Ms. HUGO denies any personal history of lung cancer. Denies any s/s of lung cancer. No lung cancer in a 1st degree relative. H/o of COPD. Denies any history of occupational exposures [N_Years] : 51 [PacksperYear] : 25

## 2024-05-02 NOTE — ED CDU PROVIDER NOTE - ENMT, MLM
Airway patent. Nasal mucosa clear. Mouth with normal mucosa. Throat has no vesicles, no oropharyngeal exudates and uvula is midline.
4 = No assist / stand by assistance

## 2024-05-10 NOTE — ED CDU PROVIDER SUBSEQUENT DAY NOTE - GASTROINTESTINAL NEGATIVE STATEMENT, MLM
Previously Declined (within the last year)
no abdominal pain, no bloating, no constipation, no diarrhea, no nausea and no vomiting.

## 2025-02-07 NOTE — ED ADULT NURSE NOTE - DRUG PRE-SCREENING (DAST -1)
Provided pre-operative instructions to patient and family with the pre-operative instructions hand out. Patient and family accepted 2.17.25 as the surgery date. Patient and family informed that below are the pre-operative clearances/requirements needed prior to surgery. Patient and family verbalized understanding.   [x]CBC/DIF [x] Medical Clearance  [x]CMP [] Cardiac Clearance  [x]EKG [] Type and Screen  []CXR [] Coag Panel  []Other Clearance, please specify:      Patient and family instructed to hold Aspirin/Aspirin products (Ibuprofen, Aleve, Advil, Excedrin, Motrin), Vitamin E and any other herbal supplements, minerals or vitamins for 7 days prior to surgery.  Tylenol okay to take prior to surgery.    Patient was instructed to hold Jardiance for 3 days prior to surgery.    Patient was instructed to take Amlodipine, Metoprolol & Levothyroxine the morning of surgery with a sip of water.    Statement Selected

## 2025-03-12 NOTE — ED ADULT TRIAGE NOTE - RESPIRATORY RATE (BREATHS/MIN)
Detail Level: Zone
16
Continue Regimen: triamcinolone acetonide 0.1 % topical cream BID\\nketoconazole 2 % topical cream
Render In Strict Bullet Format?: No

## 2025-04-16 NOTE — ED PROVIDER NOTE - GASTROINTESTINAL, MLM
Post-Care Instructions: I reviewed with the patient in detail post-care instructions. Patient is to wear sunprotection, and avoid picking at any of the treated lesions. Pt may apply Vaseline to crusted or scabbing areas.
Duration Of Freeze Thaw-Cycle (Seconds): 10
Number Of Freeze-Thaw Cycles: 1 freeze-thaw cycle
Application Tool (Optional): Liquid Nitrogen Sprayer
Render Note In Bullet Format When Appropriate: No
Show Aperture Variable?: Yes
Consent: The patient's consent was obtained including but not limited to risks of crusting, scabbing, blistering, scarring, darker or lighter pigmentary change, recurrence, incomplete removal and infection.
Detail Level: Detailed
Abdomen soft, non-tender, no guarding.

## (undated) DEVICE — DRSG TEGADERM 4X4.75"

## (undated) DEVICE — SOL IRR POUR NS 0.9% 500ML

## (undated) DEVICE — DRAPE 3/4 SHEET 52X76"

## (undated) DEVICE — SUT MONOCRYL 3-0 18" PS-2 UNDYED

## (undated) DEVICE — WARMING BLANKET LOWER ADULT

## (undated) DEVICE — ELCTR BOVIE TIP BLADE INSULATED 2.75" EDGE

## (undated) DEVICE — SUT SILK 2-0 30" SH

## (undated) DEVICE — ELCTR GROUNDING PAD ADULT COVIDIEN

## (undated) DEVICE — SUT CHROMIC 2-0 27" CT-1

## (undated) DEVICE — GLV 6.5 PROTEXIS (WHITE)

## (undated) DEVICE — GLV 7 PROTEXIS (WHITE)

## (undated) DEVICE — DRSG TELFA .5 X 3

## (undated) DEVICE — DRAPE CHEST BREAST 106" X 122"

## (undated) DEVICE — DRSG STERISTRIPS 0.5 X 4"

## (undated) DEVICE — DRAPE TOWEL BLUE 17" X 24"

## (undated) DEVICE — POSITIONER STRAP ARMBOARD VELCRO TS-30

## (undated) DEVICE — VENODYNE/SCD SLEEVE CALF MEDIUM

## (undated) DEVICE — RADIOGRAPHY DVC SPEC TRANSPEC

## (undated) DEVICE — GOWN LG